# Patient Record
Sex: MALE | Race: WHITE | Employment: OTHER | URBAN - METROPOLITAN AREA
[De-identification: names, ages, dates, MRNs, and addresses within clinical notes are randomized per-mention and may not be internally consistent; named-entity substitution may affect disease eponyms.]

---

## 2020-10-05 RX ORDER — ATORVASTATIN CALCIUM 20 MG/1
20 TABLET, FILM COATED ORAL NIGHTLY
COMMUNITY

## 2020-10-05 RX ORDER — METOPROLOL TARTRATE 50 MG/1
50 TABLET, FILM COATED ORAL 2 TIMES DAILY
COMMUNITY
End: 2021-01-19

## 2020-10-05 RX ORDER — LISINOPRIL 40 MG/1
40 TABLET ORAL DAILY
COMMUNITY

## 2020-10-05 RX ORDER — POTASSIUM CHLORIDE 1.5 G/1.77G
20 POWDER, FOR SOLUTION ORAL DAILY
COMMUNITY

## 2020-10-05 RX ORDER — TIZANIDINE 4 MG/1
4 TABLET ORAL 2 TIMES DAILY
COMMUNITY

## 2020-10-05 RX ORDER — HYDROCHLOROTHIAZIDE 25 MG/1
25 TABLET ORAL DAILY
COMMUNITY

## 2020-10-05 RX ORDER — FUROSEMIDE 20 MG/1
20 TABLET ORAL 2 TIMES DAILY
COMMUNITY

## 2020-10-05 NOTE — PROGRESS NOTES
Preoperative Screening for Elective Surgery/Invasive Procedures While COVID-19 present in the community     Have you had any of the following symptoms? o Fever, chills  o Cough  o Shortness of breath  o Muscle aches/pain  o Diarrhea  o Abdominal pain, nausea, vomiting  o Loss or decrease in taste and / or smell   Risk of Exposure  o Have you recently been hospitalized for COVID-19 or flu-like illness, if so when?  o Recently diagnosed with COVID-19, if so when?  o Recently tested for COVID-19, if so when?  o Have you been in close contact with a person or family member who currently has or recently had COVID-19? If yes, when and in what context?  o Do you live with anybody who in the last 14 days has had fever, chills, shortness of breath, muscle aches, flu-like illness?  o Do you have any close contacts or family members who are currently in the hospital for COVID-19 or flu-like illness? If yes, assess recent close contact with this person. Indicate if the patient has a positive screen by answering yes to one or more of the above questions. Patients who test positive or screen positive prior to surgery or on the day of surgery should be evaluated in conjunction with the surgeon/proceduralist/anesthesiologist to determine the urgency of the procedure.      Pt denies all

## 2020-10-08 ENCOUNTER — HOSPITAL ENCOUNTER (OUTPATIENT)
Age: 60
Setting detail: OUTPATIENT SURGERY
Discharge: HOME HEALTH | End: 2020-10-08
Attending: ORTHOPAEDIC SURGERY | Admitting: ORTHOPAEDIC SURGERY
Payer: MEDICARE

## 2020-10-08 ENCOUNTER — ANESTHESIA (OUTPATIENT)
Dept: OPERATING ROOM | Age: 60
End: 2020-10-08
Payer: MEDICARE

## 2020-10-08 ENCOUNTER — ANESTHESIA EVENT (OUTPATIENT)
Dept: OPERATING ROOM | Age: 60
End: 2020-10-08
Payer: MEDICARE

## 2020-10-08 ENCOUNTER — APPOINTMENT (OUTPATIENT)
Dept: GENERAL RADIOLOGY | Age: 60
End: 2020-10-08
Attending: ORTHOPAEDIC SURGERY
Payer: MEDICARE

## 2020-10-08 VITALS
SYSTOLIC BLOOD PRESSURE: 102 MMHG | DIASTOLIC BLOOD PRESSURE: 53 MMHG | RESPIRATION RATE: 1 BRPM | OXYGEN SATURATION: 100 %

## 2020-10-08 VITALS
DIASTOLIC BLOOD PRESSURE: 57 MMHG | HEIGHT: 72 IN | TEMPERATURE: 97.2 F | OXYGEN SATURATION: 99 % | RESPIRATION RATE: 16 BRPM | BODY MASS INDEX: 36.3 KG/M2 | HEART RATE: 63 BPM | SYSTOLIC BLOOD PRESSURE: 115 MMHG | WEIGHT: 268 LBS

## 2020-10-08 LAB
APPEARANCE FLUID: NORMAL
CELL COUNT FLUID TYPE: NORMAL
CLOT EVALUATION: NORMAL
COLOR FLUID: NORMAL
LYMPHOCYTES, BODY FLUID: 7 %
MONOCYTE, FLUID: 1 %
NEUTROPHIL, FLUID: 92 %
NUCLEATED CELLS FLUID: NORMAL /CUMM
NUMBER OF CELLS COUNTED FLUID: 100
RBC FLUID: NORMAL /CUMM

## 2020-10-08 PROCEDURE — 2580000003 HC RX 258: Performed by: ANESTHESIOLOGY

## 2020-10-08 PROCEDURE — 3600000013 HC SURGERY LEVEL 3 ADDTL 15MIN: Performed by: ORTHOPAEDIC SURGERY

## 2020-10-08 PROCEDURE — 6360000002 HC RX W HCPCS: Performed by: NURSE ANESTHETIST, CERTIFIED REGISTERED

## 2020-10-08 PROCEDURE — 87116 MYCOBACTERIA CULTURE: CPT

## 2020-10-08 PROCEDURE — 2709999900 HC NON-CHARGEABLE SUPPLY: Performed by: ORTHOPAEDIC SURGERY

## 2020-10-08 PROCEDURE — 89051 BODY FLUID CELL COUNT: CPT

## 2020-10-08 PROCEDURE — 20610 DRAIN/INJ JOINT/BURSA W/O US: CPT

## 2020-10-08 PROCEDURE — 87070 CULTURE OTHR SPECIMN AEROBIC: CPT

## 2020-10-08 PROCEDURE — 3209999900 FLUORO FOR SURGICAL PROCEDURES

## 2020-10-08 PROCEDURE — 6360000002 HC RX W HCPCS: Performed by: ORTHOPAEDIC SURGERY

## 2020-10-08 PROCEDURE — 87205 SMEAR GRAM STAIN: CPT

## 2020-10-08 PROCEDURE — 3600000003 HC SURGERY LEVEL 3 BASE: Performed by: ORTHOPAEDIC SURGERY

## 2020-10-08 PROCEDURE — 3700000001 HC ADD 15 MINUTES (ANESTHESIA): Performed by: ORTHOPAEDIC SURGERY

## 2020-10-08 PROCEDURE — 87015 SPECIMEN INFECT AGNT CONCNTJ: CPT

## 2020-10-08 PROCEDURE — 87075 CULTR BACTERIA EXCEPT BLOOD: CPT

## 2020-10-08 PROCEDURE — 87102 FUNGUS ISOLATION CULTURE: CPT

## 2020-10-08 PROCEDURE — 7100000011 HC PHASE II RECOVERY - ADDTL 15 MIN: Performed by: ORTHOPAEDIC SURGERY

## 2020-10-08 PROCEDURE — 7100000010 HC PHASE II RECOVERY - FIRST 15 MIN: Performed by: ORTHOPAEDIC SURGERY

## 2020-10-08 PROCEDURE — 87206 SMEAR FLUORESCENT/ACID STAI: CPT

## 2020-10-08 PROCEDURE — 3700000000 HC ANESTHESIA ATTENDED CARE: Performed by: ORTHOPAEDIC SURGERY

## 2020-10-08 RX ORDER — SODIUM CHLORIDE, SODIUM LACTATE, POTASSIUM CHLORIDE, CALCIUM CHLORIDE 600; 310; 30; 20 MG/100ML; MG/100ML; MG/100ML; MG/100ML
INJECTION, SOLUTION INTRAVENOUS CONTINUOUS
Status: DISCONTINUED | OUTPATIENT
Start: 2020-10-08 | End: 2020-10-08 | Stop reason: HOSPADM

## 2020-10-08 RX ORDER — LIDOCAINE HYDROCHLORIDE 10 MG/ML
2 INJECTION, SOLUTION INFILTRATION; PERINEURAL
Status: DISCONTINUED | OUTPATIENT
Start: 2020-10-08 | End: 2020-10-08 | Stop reason: HOSPADM

## 2020-10-08 RX ORDER — ONDANSETRON 2 MG/ML
4 INJECTION INTRAMUSCULAR; INTRAVENOUS EVERY 10 MIN PRN
Status: DISCONTINUED | OUTPATIENT
Start: 2020-10-08 | End: 2020-10-08 | Stop reason: HOSPADM

## 2020-10-08 RX ORDER — LABETALOL HYDROCHLORIDE 5 MG/ML
5 INJECTION, SOLUTION INTRAVENOUS EVERY 10 MIN PRN
Status: DISCONTINUED | OUTPATIENT
Start: 2020-10-08 | End: 2020-10-08 | Stop reason: HOSPADM

## 2020-10-08 RX ORDER — MEPERIDINE HYDROCHLORIDE 50 MG/ML
12.5 INJECTION INTRAMUSCULAR; INTRAVENOUS; SUBCUTANEOUS EVERY 5 MIN PRN
Status: DISCONTINUED | OUTPATIENT
Start: 2020-10-08 | End: 2020-10-08 | Stop reason: HOSPADM

## 2020-10-08 RX ORDER — OXYCODONE HYDROCHLORIDE AND ACETAMINOPHEN 5; 325 MG/1; MG/1
1 TABLET ORAL PRN
Status: DISCONTINUED | OUTPATIENT
Start: 2020-10-08 | End: 2020-10-08 | Stop reason: HOSPADM

## 2020-10-08 RX ORDER — M-VIT,TX,IRON,MINS/CALC/FOLIC 27MG-0.4MG
1 TABLET ORAL DAILY
COMMUNITY

## 2020-10-08 RX ORDER — ONDANSETRON 2 MG/ML
INJECTION INTRAMUSCULAR; INTRAVENOUS PRN
Status: DISCONTINUED | OUTPATIENT
Start: 2020-10-08 | End: 2020-10-08 | Stop reason: SDUPTHER

## 2020-10-08 RX ORDER — FENTANYL CITRATE 50 UG/ML
INJECTION, SOLUTION INTRAMUSCULAR; INTRAVENOUS PRN
Status: DISCONTINUED | OUTPATIENT
Start: 2020-10-08 | End: 2020-10-08 | Stop reason: SDUPTHER

## 2020-10-08 RX ORDER — MIDAZOLAM HYDROCHLORIDE 1 MG/ML
INJECTION INTRAMUSCULAR; INTRAVENOUS PRN
Status: DISCONTINUED | OUTPATIENT
Start: 2020-10-08 | End: 2020-10-08 | Stop reason: SDUPTHER

## 2020-10-08 RX ORDER — OXYCODONE HYDROCHLORIDE AND ACETAMINOPHEN 5; 325 MG/1; MG/1
2 TABLET ORAL PRN
Status: DISCONTINUED | OUTPATIENT
Start: 2020-10-08 | End: 2020-10-08 | Stop reason: HOSPADM

## 2020-10-08 RX ORDER — HYDRALAZINE HYDROCHLORIDE 20 MG/ML
5 INJECTION INTRAMUSCULAR; INTRAVENOUS EVERY 10 MIN PRN
Status: DISCONTINUED | OUTPATIENT
Start: 2020-10-08 | End: 2020-10-08 | Stop reason: HOSPADM

## 2020-10-08 RX ORDER — PROPOFOL 10 MG/ML
INJECTION, EMULSION INTRAVENOUS PRN
Status: DISCONTINUED | OUTPATIENT
Start: 2020-10-08 | End: 2020-10-08 | Stop reason: SDUPTHER

## 2020-10-08 RX ADMIN — FENTANYL CITRATE 100 MCG: 50 INJECTION INTRAMUSCULAR; INTRAVENOUS at 11:50

## 2020-10-08 RX ADMIN — ONDANSETRON 4 MG: 2 INJECTION INTRAMUSCULAR; INTRAVENOUS at 11:52

## 2020-10-08 RX ADMIN — SODIUM CHLORIDE, POTASSIUM CHLORIDE, SODIUM LACTATE AND CALCIUM CHLORIDE: 600; 310; 30; 20 INJECTION, SOLUTION INTRAVENOUS at 10:34

## 2020-10-08 RX ADMIN — SODIUM CHLORIDE, SODIUM LACTATE, POTASSIUM CHLORIDE, AND CALCIUM CHLORIDE: .6; .31; .03; .02 INJECTION, SOLUTION INTRAVENOUS at 11:27

## 2020-10-08 RX ADMIN — MIDAZOLAM HYDROCHLORIDE 2 MG: 2 INJECTION, SOLUTION INTRAMUSCULAR; INTRAVENOUS at 11:45

## 2020-10-08 RX ADMIN — Medication 3 G: at 11:47

## 2020-10-08 RX ADMIN — PROPOFOL 150 MG: 10 INJECTION, EMULSION INTRAVENOUS at 11:52

## 2020-10-08 ASSESSMENT — PULMONARY FUNCTION TESTS
PIF_VALUE: 0
PIF_VALUE: 13
PIF_VALUE: 0
PIF_VALUE: 1
PIF_VALUE: 0
PIF_VALUE: 11
PIF_VALUE: 0
PIF_VALUE: 0

## 2020-10-08 ASSESSMENT — PAIN DESCRIPTION - DESCRIPTORS: DESCRIPTORS: ACHING

## 2020-10-08 ASSESSMENT — PAIN - FUNCTIONAL ASSESSMENT: PAIN_FUNCTIONAL_ASSESSMENT: 0-10

## 2020-10-08 NOTE — ANESTHESIA PRE PROCEDURE
afternoon and 2 at night      TESTOSTERONE CYPIONATE IJ Inject 0.25 mg as directed once a week Every Friday      potassium chloride (KLOR-CON) 20 MEQ packet Take 20 mEq by mouth daily         Allergies:  No Known Allergies    Problem List:  There is no problem list on file for this patient. Past Medical History:        Diagnosis Date    Hypertension        Past Surgical History:        Procedure Laterality Date    DENTAL SURGERY      teeth extracted    ELBOW SURGERY Right     for tendonitis    FOOT SURGERY Bilateral     bones reset; pins and screws placed right foot    HIP SURGERY Left 1974    left hip pinning/screws    JOINT REPLACEMENT Right 2019    hip replacement x2        Social History:    Social History     Tobacco Use    Smoking status: Former Smoker    Smokeless tobacco: Never Used   Substance Use Topics    Alcohol use: Not Currently                                Counseling given: Not Answered      Vital Signs (Current):   Vitals:    10/05/20 1206   Weight: 265 lb (120.2 kg)   Height: 6' (1.829 m)                                              BP Readings from Last 3 Encounters:   No data found for BP       NPO Status:                                                                                 BMI:   Wt Readings from Last 3 Encounters:   No data found for Wt     Body mass index is 35.94 kg/m². CBC: No results found for: WBC, RBC, HGB, HCT, MCV, RDW, PLT    CMP: No results found for: NA, K, CL, CO2, BUN, CREATININE, GFRAA, AGRATIO, LABGLOM, GLUCOSE, PROT, CALCIUM, BILITOT, ALKPHOS, AST, ALT    POC Tests: No results for input(s): POCGLU, POCNA, POCK, POCCL, POCBUN, POCHEMO, POCHCT in the last 72 hours.     Coags: No results found for: PROTIME, INR, APTT    HCG (If Applicable): No results found for: PREGTESTUR, PREGSERUM, HCG, HCGQUANT     ABGs: No results found for: PHART, PO2ART, VBK8HCK, RMO8KMK, BEART, Z7IXMRGQ     Type & Screen (If Applicable):  No results found for: LABABO, [address]    Drug/Infectious Status (If Applicable):  No results found for: HIV, HEPCAB    COVID-19 Screening (If Applicable): No results found for: COVID19      Anesthesia Evaluation  Patient summary reviewed and Nursing notes reviewed no history of anesthetic complications:   Airway: Mallampati: II  TM distance: >3 FB   Neck ROM: full  Mouth opening: > = 3 FB Dental:    (+) edentulous      Pulmonary:Negative Pulmonary ROS                              Cardiovascular:    (+) hypertension:,                   Neuro/Psych:   Negative Neuro/Psych ROS              GI/Hepatic/Renal: Neg GI/Hepatic/Renal ROS       (-) GERD, liver disease and no renal disease       Endo/Other: Negative Endo/Other ROS       (-) diabetes mellitus               Abdominal:           Vascular: negative vascular ROS. Anesthesia Plan      general     ASA 3     (I discussed with the patient the risks and benefits of PIV, general anesthesia, IV Narcotics, PACU. All questions were answered the patient agrees with the plan)  Induction: intravenous. MIPS: Prophylactic antiemetics administered. Anesthetic plan and risks discussed with patient. Plan discussed with CRNA.                   Chavez Carvajal MD   10/8/2020

## 2020-10-08 NOTE — BRIEF OP NOTE
Brief Postoperative Note      Patient: Lauryn Celis  YOB: 1960  MRN: [id number]    Date of Procedure: 10/8/2020    Pre-Op Diagnosis: RIGHT HIP PAIN/INFECTION    Post-Op Diagnosis: Same       Procedure(s):  RIGHT HIP ASPIRATION WITH SYNOVASURE    Surgeon(s):  Wing Prescott MD    Assistant:  Surgical Assistant: Arthuro Eastern    Anesthesia: General    Estimated Blood Loss (mL): Minimal    Complications: None    Specimens:   ID Type Source Tests Collected by Time Destination   1 : Right hip fluid Body Fluid Fluid BODY FLUID CELL COUNT WITH DIFFERENTIAL Wing Prescott MD 10/8/2020 1200        Implants:  * No implants in log *      Drains: * No LDAs found *    Findings: none    Electronically signed by Wing Prescott MD on 10/8/2020 at 12:01 PM

## 2020-10-08 NOTE — ANESTHESIA POSTPROCEDURE EVALUATION
Department of Anesthesiology  Postprocedure Note    Patient: Miguel Angel Dueñas  MRN: [id number]  YOB: 1960  Date of evaluation: 10/8/2020  Time:  2:31 PM     Procedure Summary     Date:  10/08/20 Room / Location:  [facility]    Anesthesia Start:  1147 Anesthesia Stop:  0436    Procedure:  RIGHT HIP ASPIRATION (Right Hip) Diagnosis:       Infection of prosthetic joint, [facility]      (RIGHT HIP PAIN/INFECTION)    Surgeon:  Stas Hilliard MD Responsible Provider:  Alia Hughes MD    Anesthesia Type:  general ASA Status:  3          Anesthesia Type: general    Darryl Phase I: Darryl Score: 10    Darryl Phase II: Darryl Score: 10    Last vitals: Reviewed and per EMR flowsheets.        Anesthesia Post Evaluation    Patient location during evaluation: PACU  Level of consciousness: awake  Airway patency: patent  Nausea & Vomiting: no nausea  Complications: no  Cardiovascular status: blood pressure returned to baseline  Respiratory status: acceptable  Hydration status: euvolemic

## 2020-10-08 NOTE — H&P
I have reviewed the patients history and physical and have found no changes. I have reviewed the procedure with the patient and answered all questions and explained the risks and benefits. The operative site was marked. Risks benefits of nartotic use and abuse were discussed as were alternatives to their use. They have consented for the use of narcotics. Because of the type of procedure the patient may need and use narcotics above 30 MED. The patient was counseled at length about the risks of arely Covid-19 in the jose-operative and post-operative states including the recovery window of their procedure. The patient was made aware that arely Covid-19 after a surgical procedure may worsen their prognosis for recovering from the virus and lend to a higher morbidity and or mortality risk. The patient was given the options of postponing their procedure. All of the risks, benefits, and alternatives were discussed. The patient does wish to proceed with the procedure.

## 2020-10-26 LAB
CULTURE, JOINT AEROBIC: NORMAL
CULTURE, JOINT ANAEROBIC: NORMAL
GRAM STAIN RESULT: NORMAL

## 2020-11-09 LAB
FUNGUS (MYCOLOGY) CULTURE: NORMAL
FUNGUS STAIN: NORMAL

## 2020-11-24 LAB
AFB CULTURE (MYCOBACTERIA): NORMAL
AFB SMEAR: NORMAL

## 2021-01-15 ENCOUNTER — ANESTHESIA EVENT (OUTPATIENT)
Dept: OPERATING ROOM | Age: 61
DRG: 468 | End: 2021-01-15
Payer: MEDICARE

## 2021-01-19 NOTE — PROGRESS NOTES
Reviewed information regarding patient with [facility] healthcare Nurse, Mariposa Herrera. Labs and history and physical will be faxed tomorrow. Pt can sign own consent. Pt is on Eliquis stopped after this morning dose.

## 2021-01-21 ENCOUNTER — APPOINTMENT (OUTPATIENT)
Dept: GENERAL RADIOLOGY | Age: 61
DRG: 468 | End: 2021-01-21
Attending: ORTHOPAEDIC SURGERY
Payer: MEDICARE

## 2021-01-21 ENCOUNTER — HOSPITAL ENCOUNTER (INPATIENT)
Age: 61
LOS: 2 days | Discharge: NURSING, REHAB OR LONG-TERM CARE | DRG: 468 | End: 2021-01-23
Attending: ORTHOPAEDIC SURGERY | Admitting: ORTHOPAEDIC SURGERY
Payer: MEDICARE

## 2021-01-21 ENCOUNTER — ANESTHESIA (OUTPATIENT)
Dept: OPERATING ROOM | Age: 61
DRG: 468 | End: 2021-01-21
Payer: MEDICARE

## 2021-01-21 VITALS
OXYGEN SATURATION: 100 % | DIASTOLIC BLOOD PRESSURE: 68 MMHG | TEMPERATURE: 98.2 F | RESPIRATION RATE: 9 BRPM | SYSTOLIC BLOOD PRESSURE: 151 MMHG

## 2021-01-21 DIAGNOSIS — Z96.649 S/P REVISION OF TOTAL HIP: Primary | ICD-10-CM

## 2021-01-21 DIAGNOSIS — T84.50XA INFECTION OF PROSTHETIC JOINT, INITIAL ENCOUNTER (HCC): ICD-10-CM

## 2021-01-21 LAB
ABO/RH: NORMAL
ANION GAP SERPL CALCULATED.3IONS-SCNC: 5 MMOL/L (ref 3–16)
ANTIBODY SCREEN: NORMAL
BUN BLDV-MCNC: 12 MG/DL (ref 7–20)
CALCIUM SERPL-MCNC: 9.4 MG/DL (ref 8.3–10.6)
CHLORIDE BLD-SCNC: 102 MMOL/L (ref 99–110)
CO2: 28 MMOL/L (ref 21–32)
CREAT SERPL-MCNC: 0.7 MG/DL (ref 0.8–1.3)
GFR AFRICAN AMERICAN: >60
GFR NON-AFRICAN AMERICAN: >60
GLUCOSE BLD-MCNC: 109 MG/DL (ref 70–99)
HCT VFR BLD CALC: 30.9 % (ref 40.5–52.5)
HEMOGLOBIN: 10.1 G/DL (ref 13.5–17.5)
MCH RBC QN AUTO: 28.1 PG (ref 26–34)
MCHC RBC AUTO-ENTMCNC: 32.8 G/DL (ref 31–36)
MCV RBC AUTO: 85.8 FL (ref 80–100)
PDW BLD-RTO: 14.7 % (ref 12.4–15.4)
PLATELET # BLD: 95 K/UL (ref 135–450)
PLATELET SLIDE REVIEW: ABNORMAL
PMV BLD AUTO: 8.3 FL (ref 5–10.5)
POTASSIUM REFLEX MAGNESIUM: 3.8 MMOL/L (ref 3.5–5.1)
RBC # BLD: 3.6 M/UL (ref 4.2–5.9)
SLIDE REVIEW: ABNORMAL
SODIUM BLD-SCNC: 135 MMOL/L (ref 136–145)
WBC # BLD: 3.1 K/UL (ref 4–11)

## 2021-01-21 PROCEDURE — 87102 FUNGUS ISOLATION CULTURE: CPT

## 2021-01-21 PROCEDURE — 3209999900 FLUORO FOR SURGICAL PROCEDURES

## 2021-01-21 PROCEDURE — C1713 ANCHOR/SCREW BN/BN,TIS/BN: HCPCS | Performed by: ORTHOPAEDIC SURGERY

## 2021-01-21 PROCEDURE — 2580000003 HC RX 258: Performed by: ANESTHESIOLOGY

## 2021-01-21 PROCEDURE — 86900 BLOOD TYPING SEROLOGIC ABO: CPT

## 2021-01-21 PROCEDURE — 87015 SPECIMEN INFECT AGNT CONCNTJ: CPT

## 2021-01-21 PROCEDURE — 87206 SMEAR FLUORESCENT/ACID STAI: CPT

## 2021-01-21 PROCEDURE — 3700000000 HC ANESTHESIA ATTENDED CARE: Performed by: ORTHOPAEDIC SURGERY

## 2021-01-21 PROCEDURE — 88305 TISSUE EXAM BY PATHOLOGIST: CPT

## 2021-01-21 PROCEDURE — 86850 RBC ANTIBODY SCREEN: CPT

## 2021-01-21 PROCEDURE — 88331 PATH CONSLTJ SURG 1 BLK 1SPC: CPT

## 2021-01-21 PROCEDURE — 73501 X-RAY EXAM HIP UNI 1 VIEW: CPT

## 2021-01-21 PROCEDURE — 80048 BASIC METABOLIC PNL TOTAL CA: CPT

## 2021-01-21 PROCEDURE — 0SP908Z REMOVAL OF SPACER FROM RIGHT HIP JOINT, OPEN APPROACH: ICD-10-PCS | Performed by: ORTHOPAEDIC SURGERY

## 2021-01-21 PROCEDURE — 87075 CULTR BACTERIA EXCEPT BLOOD: CPT

## 2021-01-21 PROCEDURE — 7100000000 HC PACU RECOVERY - FIRST 15 MIN: Performed by: ORTHOPAEDIC SURGERY

## 2021-01-21 PROCEDURE — 2500000003 HC RX 250 WO HCPCS: Performed by: ORTHOPAEDIC SURGERY

## 2021-01-21 PROCEDURE — 2500000003 HC RX 250 WO HCPCS: Performed by: NURSE ANESTHETIST, CERTIFIED REGISTERED

## 2021-01-21 PROCEDURE — 97166 OT EVAL MOD COMPLEX 45 MIN: CPT

## 2021-01-21 PROCEDURE — 85027 COMPLETE CBC AUTOMATED: CPT

## 2021-01-21 PROCEDURE — 2709999900 HC NON-CHARGEABLE SUPPLY: Performed by: ORTHOPAEDIC SURGERY

## 2021-01-21 PROCEDURE — 7100000001 HC PACU RECOVERY - ADDTL 15 MIN: Performed by: ORTHOPAEDIC SURGERY

## 2021-01-21 PROCEDURE — C1776 JOINT DEVICE (IMPLANTABLE): HCPCS | Performed by: ORTHOPAEDIC SURGERY

## 2021-01-21 PROCEDURE — 6360000002 HC RX W HCPCS: Performed by: ANESTHESIOLOGY

## 2021-01-21 PROCEDURE — 87116 MYCOBACTERIA CULTURE: CPT

## 2021-01-21 PROCEDURE — 6360000002 HC RX W HCPCS: Performed by: NURSE ANESTHETIST, CERTIFIED REGISTERED

## 2021-01-21 PROCEDURE — 2720000010 HC SURG SUPPLY STERILE: Performed by: ORTHOPAEDIC SURGERY

## 2021-01-21 PROCEDURE — 3600000015 HC SURGERY LEVEL 5 ADDTL 15MIN: Performed by: ORTHOPAEDIC SURGERY

## 2021-01-21 PROCEDURE — 86901 BLOOD TYPING SEROLOGIC RH(D): CPT

## 2021-01-21 PROCEDURE — 2580000003 HC RX 258: Performed by: ORTHOPAEDIC SURGERY

## 2021-01-21 PROCEDURE — 2500000003 HC RX 250 WO HCPCS: Performed by: ANESTHESIOLOGY

## 2021-01-21 PROCEDURE — 3600000005 HC SURGERY LEVEL 5 BASE: Performed by: ORTHOPAEDIC SURGERY

## 2021-01-21 PROCEDURE — 0SW90JZ REVISION OF SYNTHETIC SUBSTITUTE IN RIGHT HIP JOINT, OPEN APPROACH: ICD-10-PCS | Performed by: ORTHOPAEDIC SURGERY

## 2021-01-21 PROCEDURE — 87070 CULTURE OTHR SPECIMN AEROBIC: CPT

## 2021-01-21 PROCEDURE — 3700000001 HC ADD 15 MINUTES (ANESTHESIA): Performed by: ORTHOPAEDIC SURGERY

## 2021-01-21 PROCEDURE — 6370000000 HC RX 637 (ALT 250 FOR IP): Performed by: ORTHOPAEDIC SURGERY

## 2021-01-21 PROCEDURE — 97530 THERAPEUTIC ACTIVITIES: CPT

## 2021-01-21 PROCEDURE — 87205 SMEAR GRAM STAIN: CPT

## 2021-01-21 PROCEDURE — 97162 PT EVAL MOD COMPLEX 30 MIN: CPT

## 2021-01-21 PROCEDURE — 6360000002 HC RX W HCPCS: Performed by: ORTHOPAEDIC SURGERY

## 2021-01-21 PROCEDURE — 2500000003 HC RX 250 WO HCPCS

## 2021-01-21 PROCEDURE — 87176 TISSUE HOMOGENIZATION CULTR: CPT

## 2021-01-21 PROCEDURE — 1200000000 HC SEMI PRIVATE

## 2021-01-21 DEVICE — SHELL ACET MH E 54 MM HIP TI TRITANIUM TRIDENT II: Type: IMPLANTABLE DEVICE | Status: FUNCTIONAL

## 2021-01-21 DEVICE — DEVICE REATTACHMENT L50MM DIA1MM STD TROCHANTERIC TI W/ CO: Type: IMPLANTABLE DEVICE | Status: FUNCTIONAL

## 2021-01-21 DEVICE — LINER ACET SZ E ID36MM THK5.9MM 10DEG X3 FOR 54-56MM: Type: IMPLANTABLE DEVICE | Status: FUNCTIONAL

## 2021-01-21 DEVICE — STEM FEM L155MM DIA18MM DST HIP TI HA STR CONIC CEM MOD REV: Type: IMPLANTABLE DEVICE | Status: FUNCTIONAL

## 2021-01-21 DEVICE — HEAD FEM DIA36MM +5MM OFFSET HIP BIOLOX DELT CERAMIC TAPR: Type: IMPLANTABLE DEVICE | Status: FUNCTIONAL

## 2021-01-21 DEVICE — BODY FEM DIA23MM +0MM OFFSET STD HIP TI CONE MOD REV RESTR: Type: IMPLANTABLE DEVICE | Status: FUNCTIONAL

## 2021-01-21 DEVICE — SCREW BNE L20MM DIA65MM LO PROF HEX TRIDENT LL: Type: IMPLANTABLE DEVICE | Status: FUNCTIONAL

## 2021-01-21 RX ORDER — HYDROMORPHONE HCL 110MG/55ML
0.5 PATIENT CONTROLLED ANALGESIA SYRINGE INTRAVENOUS
Status: DISCONTINUED | OUTPATIENT
Start: 2021-01-21 | End: 2021-01-22

## 2021-01-21 RX ORDER — ACETAMINOPHEN 325 MG/1
650 TABLET ORAL EVERY 6 HOURS PRN
COMMUNITY

## 2021-01-21 RX ORDER — LIDOCAINE HYDROCHLORIDE 20 MG/ML
INJECTION, SOLUTION INFILTRATION; PERINEURAL PRN
Status: DISCONTINUED | OUTPATIENT
Start: 2021-01-21 | End: 2021-01-21 | Stop reason: SDUPTHER

## 2021-01-21 RX ORDER — SENNA PLUS 8.6 MG/1
1 TABLET ORAL NIGHTLY
Status: DISCONTINUED | OUTPATIENT
Start: 2021-01-21 | End: 2021-01-23 | Stop reason: HOSPADM

## 2021-01-21 RX ORDER — OXYCODONE HCL 20 MG/1
20 TABLET, FILM COATED, EXTENDED RELEASE ORAL EVERY 12 HOURS
Status: ON HOLD | COMMUNITY
End: 2021-01-23 | Stop reason: SDUPTHER

## 2021-01-21 RX ORDER — CITALOPRAM 20 MG/1
20 TABLET ORAL DAILY
COMMUNITY

## 2021-01-21 RX ORDER — PANTOPRAZOLE SODIUM 40 MG/1
40 TABLET, DELAYED RELEASE ORAL DAILY
COMMUNITY

## 2021-01-21 RX ORDER — SODIUM CHLORIDE 0.9 % (FLUSH) 0.9 %
10 SYRINGE (ML) INJECTION PRN
Status: DISCONTINUED | OUTPATIENT
Start: 2021-01-21 | End: 2021-01-23 | Stop reason: HOSPADM

## 2021-01-21 RX ORDER — OXYCODONE HYDROCHLORIDE AND ACETAMINOPHEN 5; 325 MG/1; MG/1
1 TABLET ORAL PRN
Status: DISCONTINUED | OUTPATIENT
Start: 2021-01-21 | End: 2021-01-21 | Stop reason: HOSPADM

## 2021-01-21 RX ORDER — HYDROMORPHONE HCL 110MG/55ML
0.25 PATIENT CONTROLLED ANALGESIA SYRINGE INTRAVENOUS
Status: DISCONTINUED | OUTPATIENT
Start: 2021-01-21 | End: 2021-01-22

## 2021-01-21 RX ORDER — SODIUM CHLORIDE 0.9 % (FLUSH) 0.9 %
10 SYRINGE (ML) INJECTION PRN
Status: DISCONTINUED | OUTPATIENT
Start: 2021-01-21 | End: 2021-01-21 | Stop reason: HOSPADM

## 2021-01-21 RX ORDER — CITALOPRAM 20 MG/1
20 TABLET ORAL DAILY
Status: DISCONTINUED | OUTPATIENT
Start: 2021-01-21 | End: 2021-01-22

## 2021-01-21 RX ORDER — ONDANSETRON 2 MG/ML
INJECTION INTRAMUSCULAR; INTRAVENOUS PRN
Status: DISCONTINUED | OUTPATIENT
Start: 2021-01-21 | End: 2021-01-21 | Stop reason: SDUPTHER

## 2021-01-21 RX ORDER — ATORVASTATIN CALCIUM 10 MG/1
20 TABLET, FILM COATED ORAL NIGHTLY
Status: DISCONTINUED | OUTPATIENT
Start: 2021-01-21 | End: 2021-01-23 | Stop reason: HOSPADM

## 2021-01-21 RX ORDER — TRAZODONE HYDROCHLORIDE 50 MG/1
50 TABLET ORAL 2 TIMES DAILY
COMMUNITY

## 2021-01-21 RX ORDER — TRANEXAMIC ACID 100 MG/ML
15 INJECTION, SOLUTION INTRAVENOUS
Status: COMPLETED | OUTPATIENT
Start: 2021-01-21 | End: 2021-01-21

## 2021-01-21 RX ORDER — GLYCOPYRROLATE 1 MG/5 ML
SYRINGE (ML) INTRAVENOUS PRN
Status: DISCONTINUED | OUTPATIENT
Start: 2021-01-21 | End: 2021-01-21 | Stop reason: SDUPTHER

## 2021-01-21 RX ORDER — DEXAMETHASONE SODIUM PHOSPHATE 10 MG/ML
INJECTION INTRAMUSCULAR; INTRAVENOUS PRN
Status: DISCONTINUED | OUTPATIENT
Start: 2021-01-21 | End: 2021-01-21 | Stop reason: SDUPTHER

## 2021-01-21 RX ORDER — MORPHINE SULFATE 2 MG/ML
2 INJECTION, SOLUTION INTRAMUSCULAR; INTRAVENOUS EVERY 5 MIN PRN
Status: DISCONTINUED | OUTPATIENT
Start: 2021-01-21 | End: 2021-01-21 | Stop reason: HOSPADM

## 2021-01-21 RX ORDER — FENTANYL CITRATE 50 UG/ML
INJECTION, SOLUTION INTRAMUSCULAR; INTRAVENOUS PRN
Status: DISCONTINUED | OUTPATIENT
Start: 2021-01-21 | End: 2021-01-21 | Stop reason: SDUPTHER

## 2021-01-21 RX ORDER — RANITIDINE 150 MG/1
150 TABLET ORAL 2 TIMES DAILY PRN
COMMUNITY

## 2021-01-21 RX ORDER — MORPHINE SULFATE 2 MG/ML
1 INJECTION, SOLUTION INTRAMUSCULAR; INTRAVENOUS EVERY 5 MIN PRN
Status: DISCONTINUED | OUTPATIENT
Start: 2021-01-21 | End: 2021-01-21 | Stop reason: HOSPADM

## 2021-01-21 RX ORDER — TRAZODONE HYDROCHLORIDE 50 MG/1
50 TABLET ORAL NIGHTLY
Status: DISCONTINUED | OUTPATIENT
Start: 2021-01-21 | End: 2021-01-23 | Stop reason: HOSPADM

## 2021-01-21 RX ORDER — SODIUM CHLORIDE 0.9 % (FLUSH) 0.9 %
10 SYRINGE (ML) INJECTION EVERY 12 HOURS SCHEDULED
Status: DISCONTINUED | OUTPATIENT
Start: 2021-01-21 | End: 2021-01-23 | Stop reason: HOSPADM

## 2021-01-21 RX ORDER — SODIUM CHLORIDE 0.9 % (FLUSH) 0.9 %
10 SYRINGE (ML) INJECTION EVERY 12 HOURS SCHEDULED
Status: DISCONTINUED | OUTPATIENT
Start: 2021-01-21 | End: 2021-01-21 | Stop reason: HOSPADM

## 2021-01-21 RX ORDER — OXYCODONE HYDROCHLORIDE 15 MG/1
15 TABLET ORAL EVERY 4 HOURS PRN
Status: ON HOLD | COMMUNITY
End: 2021-01-23 | Stop reason: SDUPTHER

## 2021-01-21 RX ORDER — DEXTROSE, SODIUM CHLORIDE, AND POTASSIUM CHLORIDE 5; .45; .15 G/100ML; G/100ML; G/100ML
1000 INJECTION INTRAVENOUS CONTINUOUS
Status: DISCONTINUED | OUTPATIENT
Start: 2021-01-21 | End: 2021-01-23 | Stop reason: HOSPADM

## 2021-01-21 RX ORDER — MAGNESIUM HYDROXIDE 1200 MG/15ML
LIQUID ORAL CONTINUOUS PRN
Status: COMPLETED | OUTPATIENT
Start: 2021-01-21 | End: 2021-01-21

## 2021-01-21 RX ORDER — METOPROLOL TARTRATE 50 MG/1
50 TABLET, FILM COATED ORAL 2 TIMES DAILY
Status: DISCONTINUED | OUTPATIENT
Start: 2021-01-21 | End: 2021-01-23 | Stop reason: HOSPADM

## 2021-01-21 RX ORDER — HYDRALAZINE HYDROCHLORIDE 20 MG/ML
5 INJECTION INTRAMUSCULAR; INTRAVENOUS EVERY 10 MIN PRN
Status: DISCONTINUED | OUTPATIENT
Start: 2021-01-21 | End: 2021-01-21 | Stop reason: HOSPADM

## 2021-01-21 RX ORDER — ROCURONIUM BROMIDE 10 MG/ML
INJECTION, SOLUTION INTRAVENOUS PRN
Status: DISCONTINUED | OUTPATIENT
Start: 2021-01-21 | End: 2021-01-21 | Stop reason: SDUPTHER

## 2021-01-21 RX ORDER — PROMETHAZINE HYDROCHLORIDE 25 MG/ML
6.25 INJECTION, SOLUTION INTRAMUSCULAR; INTRAVENOUS
Status: DISCONTINUED | OUTPATIENT
Start: 2021-01-21 | End: 2021-01-21 | Stop reason: HOSPADM

## 2021-01-21 RX ORDER — SODIUM CHLORIDE, SODIUM LACTATE, POTASSIUM CHLORIDE, CALCIUM CHLORIDE 600; 310; 30; 20 MG/100ML; MG/100ML; MG/100ML; MG/100ML
INJECTION, SOLUTION INTRAVENOUS CONTINUOUS
Status: DISCONTINUED | OUTPATIENT
Start: 2021-01-21 | End: 2021-01-21

## 2021-01-21 RX ORDER — TIZANIDINE 4 MG/1
4 TABLET ORAL 2 TIMES DAILY
Status: DISCONTINUED | OUTPATIENT
Start: 2021-01-21 | End: 2021-01-23 | Stop reason: HOSPADM

## 2021-01-21 RX ORDER — DIPHENHYDRAMINE HYDROCHLORIDE 50 MG/ML
12.5 INJECTION INTRAMUSCULAR; INTRAVENOUS
Status: DISCONTINUED | OUTPATIENT
Start: 2021-01-21 | End: 2021-01-21 | Stop reason: HOSPADM

## 2021-01-21 RX ORDER — PANTOPRAZOLE SODIUM 40 MG/1
40 TABLET, DELAYED RELEASE ORAL DAILY
Status: DISCONTINUED | OUTPATIENT
Start: 2021-01-21 | End: 2021-01-23 | Stop reason: HOSPADM

## 2021-01-21 RX ORDER — LABETALOL HYDROCHLORIDE 5 MG/ML
5 INJECTION, SOLUTION INTRAVENOUS EVERY 10 MIN PRN
Status: DISCONTINUED | OUTPATIENT
Start: 2021-01-21 | End: 2021-01-21 | Stop reason: HOSPADM

## 2021-01-21 RX ORDER — OXYCODONE HYDROCHLORIDE 15 MG/1
15 TABLET ORAL EVERY 4 HOURS PRN
Status: DISCONTINUED | OUTPATIENT
Start: 2021-01-21 | End: 2021-01-23 | Stop reason: HOSPADM

## 2021-01-21 RX ORDER — PROPOFOL 10 MG/ML
INJECTION, EMULSION INTRAVENOUS PRN
Status: DISCONTINUED | OUTPATIENT
Start: 2021-01-21 | End: 2021-01-21 | Stop reason: SDUPTHER

## 2021-01-21 RX ORDER — HYDROMORPHONE HCL 110MG/55ML
PATIENT CONTROLLED ANALGESIA SYRINGE INTRAVENOUS PRN
Status: DISCONTINUED | OUTPATIENT
Start: 2021-01-21 | End: 2021-01-21 | Stop reason: SDUPTHER

## 2021-01-21 RX ORDER — M-VIT,TX,IRON,MINS/CALC/FOLIC 27MG-0.4MG
1 TABLET ORAL DAILY
Status: DISCONTINUED | OUTPATIENT
Start: 2021-01-21 | End: 2021-01-23 | Stop reason: HOSPADM

## 2021-01-21 RX ORDER — ONDANSETRON 2 MG/ML
4 INJECTION INTRAMUSCULAR; INTRAVENOUS PRN
Status: DISCONTINUED | OUTPATIENT
Start: 2021-01-21 | End: 2021-01-21 | Stop reason: HOSPADM

## 2021-01-21 RX ORDER — OXYCODONE HYDROCHLORIDE AND ACETAMINOPHEN 5; 325 MG/1; MG/1
2 TABLET ORAL PRN
Status: DISCONTINUED | OUTPATIENT
Start: 2021-01-21 | End: 2021-01-21 | Stop reason: HOSPADM

## 2021-01-21 RX ORDER — ACETAMINOPHEN 325 MG/1
650 TABLET ORAL EVERY 6 HOURS
Status: DISCONTINUED | OUTPATIENT
Start: 2021-01-21 | End: 2021-01-23 | Stop reason: HOSPADM

## 2021-01-21 RX ORDER — OXYCODONE HCL 20 MG/1
20 TABLET, FILM COATED, EXTENDED RELEASE ORAL EVERY 12 HOURS
Status: DISCONTINUED | OUTPATIENT
Start: 2021-01-21 | End: 2021-01-23 | Stop reason: HOSPADM

## 2021-01-21 RX ORDER — SENNA PLUS 8.6 MG/1
1 TABLET ORAL NIGHTLY
COMMUNITY

## 2021-01-21 RX ORDER — MEPERIDINE HYDROCHLORIDE 50 MG/ML
12.5 INJECTION INTRAMUSCULAR; INTRAVENOUS; SUBCUTANEOUS EVERY 5 MIN PRN
Status: DISCONTINUED | OUTPATIENT
Start: 2021-01-21 | End: 2021-01-21 | Stop reason: HOSPADM

## 2021-01-21 RX ADMIN — CEFAZOLIN SODIUM 2000 MG: 10 INJECTION, POWDER, FOR SOLUTION INTRAVENOUS at 15:36

## 2021-01-21 RX ADMIN — DEXAMETHASONE SODIUM PHOSPHATE 8 MG: 10 INJECTION INTRAMUSCULAR; INTRAVENOUS at 08:26

## 2021-01-21 RX ADMIN — ATORVASTATIN CALCIUM 20 MG: 10 TABLET, FILM COATED ORAL at 20:30

## 2021-01-21 RX ADMIN — FENTANYL CITRATE 150 MCG: 50 INJECTION INTRAMUSCULAR; INTRAVENOUS at 08:21

## 2021-01-21 RX ADMIN — PROPOFOL 50 MG: 10 INJECTION, EMULSION INTRAVENOUS at 10:30

## 2021-01-21 RX ADMIN — MORPHINE SULFATE 1 MG: 2 INJECTION, SOLUTION INTRAMUSCULAR; INTRAVENOUS at 11:04

## 2021-01-21 RX ADMIN — HYDROMORPHONE HYDROCHLORIDE 0.5 MG: 1 INJECTION, SOLUTION INTRAMUSCULAR; INTRAVENOUS; SUBCUTANEOUS at 11:12

## 2021-01-21 RX ADMIN — FENTANYL CITRATE 100 MCG: 50 INJECTION INTRAMUSCULAR; INTRAVENOUS at 09:08

## 2021-01-21 RX ADMIN — LIDOCAINE HYDROCHLORIDE 80 MG: 20 INJECTION, SOLUTION INFILTRATION; PERINEURAL at 08:21

## 2021-01-21 RX ADMIN — SODIUM CHLORIDE, POTASSIUM CHLORIDE, SODIUM LACTATE AND CALCIUM CHLORIDE: 600; 310; 30; 20 INJECTION, SOLUTION INTRAVENOUS at 07:57

## 2021-01-21 RX ADMIN — MORPHINE SULFATE 2 MG: 2 INJECTION, SOLUTION INTRAMUSCULAR; INTRAVENOUS at 11:23

## 2021-01-21 RX ADMIN — Medication 0.2 MG: at 08:55

## 2021-01-21 RX ADMIN — ONDANSETRON 4 MG: 2 INJECTION INTRAMUSCULAR; INTRAVENOUS at 08:26

## 2021-01-21 RX ADMIN — OXYCODONE HYDROCHLORIDE 15 MG: 15 TABLET ORAL at 23:45

## 2021-01-21 RX ADMIN — CEFAZOLIN SODIUM 2 G: 10 INJECTION, POWDER, FOR SOLUTION INTRAVENOUS at 08:18

## 2021-01-21 RX ADMIN — CEFAZOLIN SODIUM 2000 MG: 10 INJECTION, POWDER, FOR SOLUTION INTRAVENOUS at 23:45

## 2021-01-21 RX ADMIN — ROCURONIUM BROMIDE 50 MG: 10 SOLUTION INTRAVENOUS at 08:21

## 2021-01-21 RX ADMIN — OXYCODONE HYDROCHLORIDE 15 MG: 15 TABLET ORAL at 13:45

## 2021-01-21 RX ADMIN — TRANEXAMIC ACID 1700 MG: 100 INJECTION, SOLUTION INTRAVENOUS at 08:29

## 2021-01-21 RX ADMIN — DEXTROSE, SODIUM CHLORIDE, AND POTASSIUM CHLORIDE 1000 ML: 5; .45; .15 INJECTION INTRAVENOUS at 13:49

## 2021-01-21 RX ADMIN — HYDROMORPHONE HYDROCHLORIDE 0.5 MG: 1 INJECTION, SOLUTION INTRAMUSCULAR; INTRAVENOUS; SUBCUTANEOUS at 10:48

## 2021-01-21 RX ADMIN — Medication 10 ML: at 20:31

## 2021-01-21 RX ADMIN — Medication 1 TABLET: at 13:45

## 2021-01-21 RX ADMIN — TIZANIDINE 4 MG: 4 TABLET ORAL at 20:30

## 2021-01-21 RX ADMIN — HYDROMORPHONE HYDROCHLORIDE 0.5 MG: 1 INJECTION, SOLUTION INTRAMUSCULAR; INTRAVENOUS; SUBCUTANEOUS at 10:58

## 2021-01-21 RX ADMIN — ROCURONIUM BROMIDE 10 MG: 10 SOLUTION INTRAVENOUS at 09:19

## 2021-01-21 RX ADMIN — BISACODYL 5 MG: 5 TABLET, COATED ORAL at 13:46

## 2021-01-21 RX ADMIN — PANTOPRAZOLE SODIUM 40 MG: 40 TABLET, DELAYED RELEASE ORAL at 13:46

## 2021-01-21 RX ADMIN — ACETAMINOPHEN 650 MG: 325 TABLET, FILM COATED ORAL at 20:35

## 2021-01-21 RX ADMIN — SODIUM CHLORIDE, POTASSIUM CHLORIDE, SODIUM LACTATE AND CALCIUM CHLORIDE: 600; 310; 30; 20 INJECTION, SOLUTION INTRAVENOUS at 09:59

## 2021-01-21 RX ADMIN — SUGAMMADEX 200 MG: 100 INJECTION, SOLUTION INTRAVENOUS at 10:29

## 2021-01-21 RX ADMIN — HYDROMORPHONE HYDROCHLORIDE 0.25 MG: 2 INJECTION INTRAMUSCULAR; INTRAVENOUS; SUBCUTANEOUS at 15:12

## 2021-01-21 RX ADMIN — HYDROMORPHONE HYDROCHLORIDE 1 MG: 2 INJECTION INTRAMUSCULAR; INTRAVENOUS; SUBCUTANEOUS at 09:56

## 2021-01-21 RX ADMIN — HYDROMORPHONE HYDROCHLORIDE 1 MG: 2 INJECTION INTRAMUSCULAR; INTRAVENOUS; SUBCUTANEOUS at 10:30

## 2021-01-21 RX ADMIN — ACETAMINOPHEN 650 MG: 325 TABLET, FILM COATED ORAL at 13:45

## 2021-01-21 RX ADMIN — TIZANIDINE 4 MG: 4 TABLET ORAL at 13:46

## 2021-01-21 RX ADMIN — HYDROMORPHONE HYDROCHLORIDE 0.5 MG: 2 INJECTION INTRAMUSCULAR; INTRAVENOUS; SUBCUTANEOUS at 20:30

## 2021-01-21 RX ADMIN — CITALOPRAM HYDROBROMIDE 20 MG: 20 TABLET ORAL at 13:45

## 2021-01-21 RX ADMIN — PROPOFOL 200 MG: 10 INJECTION, EMULSION INTRAVENOUS at 08:21

## 2021-01-21 RX ADMIN — MORPHINE SULFATE 2 MG: 2 INJECTION, SOLUTION INTRAMUSCULAR; INTRAVENOUS at 11:09

## 2021-01-21 RX ADMIN — OXYCODONE HYDROCHLORIDE 20 MG: 20 TABLET, FILM COATED, EXTENDED RELEASE ORAL at 18:12

## 2021-01-21 RX ADMIN — SENNOSIDES 8.6 MG: 8.6 TABLET, FILM COATED ORAL at 20:30

## 2021-01-21 RX ADMIN — METOPROLOL TARTRATE 50 MG: 50 TABLET, FILM COATED ORAL at 20:30

## 2021-01-21 RX ADMIN — FAMOTIDINE 20 MG: 10 INJECTION, SOLUTION INTRAVENOUS at 07:56

## 2021-01-21 RX ADMIN — HYDROMORPHONE HYDROCHLORIDE 0.5 MG: 1 INJECTION, SOLUTION INTRAMUSCULAR; INTRAVENOUS; SUBCUTANEOUS at 10:53

## 2021-01-21 RX ADMIN — TRAZODONE HYDROCHLORIDE 50 MG: 50 TABLET ORAL at 23:45

## 2021-01-21 ASSESSMENT — PULMONARY FUNCTION TESTS
PIF_VALUE: 20
PIF_VALUE: 15
PIF_VALUE: 2
PIF_VALUE: 17
PIF_VALUE: 17
PIF_VALUE: 18
PIF_VALUE: 16
PIF_VALUE: 18
PIF_VALUE: 18
PIF_VALUE: 19
PIF_VALUE: 19
PIF_VALUE: 18
PIF_VALUE: 18
PIF_VALUE: 0
PIF_VALUE: 18
PIF_VALUE: 17
PIF_VALUE: 0
PIF_VALUE: 17
PIF_VALUE: 2
PIF_VALUE: 17
PIF_VALUE: 17
PIF_VALUE: 16
PIF_VALUE: 16
PIF_VALUE: 15
PIF_VALUE: 17
PIF_VALUE: 16
PIF_VALUE: 18
PIF_VALUE: 17
PIF_VALUE: 6
PIF_VALUE: 17
PIF_VALUE: 19
PIF_VALUE: 17
PIF_VALUE: 19
PIF_VALUE: 19
PIF_VALUE: 17
PIF_VALUE: 15
PIF_VALUE: 17
PIF_VALUE: 18
PIF_VALUE: 17
PIF_VALUE: 18
PIF_VALUE: 17
PIF_VALUE: 15
PIF_VALUE: 17
PIF_VALUE: 16
PIF_VALUE: 17
PIF_VALUE: 17
PIF_VALUE: 18
PIF_VALUE: 19
PIF_VALUE: 17
PIF_VALUE: 16
PIF_VALUE: 17
PIF_VALUE: 17
PIF_VALUE: 16
PIF_VALUE: 20
PIF_VALUE: 15
PIF_VALUE: 1
PIF_VALUE: 16
PIF_VALUE: 17
PIF_VALUE: 17
PIF_VALUE: 16
PIF_VALUE: 7
PIF_VALUE: 18
PIF_VALUE: 16
PIF_VALUE: 5
PIF_VALUE: 17
PIF_VALUE: 17
PIF_VALUE: 16

## 2021-01-21 ASSESSMENT — PAIN DESCRIPTION - ORIENTATION
ORIENTATION: RIGHT

## 2021-01-21 ASSESSMENT — PAIN DESCRIPTION - DESCRIPTORS
DESCRIPTORS: CONSTANT
DESCRIPTORS: DISCOMFORT;CONSTANT
DESCRIPTORS: CONSTANT
DESCRIPTORS: SORE

## 2021-01-21 ASSESSMENT — PAIN DESCRIPTION - PAIN TYPE
TYPE: SURGICAL PAIN;ACUTE PAIN
TYPE: SURGICAL PAIN
TYPE: SURGICAL PAIN

## 2021-01-21 ASSESSMENT — PAIN SCALES - GENERAL
PAINLEVEL_OUTOF10: 10
PAINLEVEL_OUTOF10: 9
PAINLEVEL_OUTOF10: 10
PAINLEVEL_OUTOF10: 9
PAINLEVEL_OUTOF10: 9
PAINLEVEL_OUTOF10: 10
PAINLEVEL_OUTOF10: 10
PAINLEVEL_OUTOF10: 9
PAINLEVEL_OUTOF10: 9

## 2021-01-21 ASSESSMENT — PAIN DESCRIPTION - LOCATION
LOCATION: HIP
LOCATION: HIP;LEG

## 2021-01-21 NOTE — PROGRESS NOTES
Patient arrived in PACU at this time and placed on monitor in stable condition. Report received from [address] [place] [address] and [address]. Will continue to monitor. Patient has oral airway in place with 8 L/min O2 via nasal cannula.

## 2021-01-21 NOTE — PROGRESS NOTES
Oral airway taken out at this time as patient is awake; patient on room air at this time with O2 in high 90s.  Will continue to monitor

## 2021-01-21 NOTE — ANESTHESIA POSTPROCEDURE EVALUATION
Department of Anesthesiology  Postprocedure Note    Patient: Joan Arevalo  MRN: [id number]  YOB: 1960  Date of evaluation: 1/21/2021  Time:  1:36 PM     Procedure Summary     Date: 01/21/21 Room / Location: [address] [place] [address]    Anesthesia Start: 0818 Anesthesia Stop: 0693    Procedure: REMOVAL OF CEMENT SPACER RIGHT HIP WITH FROZEN SECTION, REVISION TOTAL HIP ARTHROPLASTY                 JOSEY / Doylene Pen (Right ) Diagnosis:       Infection of prosthetic joint, initial encounter ([facility] [address]      (RIGHT HIP INFECTION)    Surgeons: Cortney Montero MD Responsible Provider: Sandra Warner MD    Anesthesia Type: general ASA Status: 3          Anesthesia Type: general    Darrly Phase I: Darryl Score: 4    Darryl Phase II:      Last vitals: Reviewed and per EMR flowsheets.        Anesthesia Post Evaluation    Comments: Postoperative Anesthesia Note    Name:    Joan Arevalo  MRN:      [id number]    Patient Vitals in the past 12 hrs:  01/21/21 1320, BP:(!) 150/79, Temp:98 °F (36.7 °C), Temp src:Oral, Pulse:57, Resp:16, SpO2:96 %  01/21/21 1305, BP:(!) 161/72, Temp:97.8 °F (36.6 °C), Temp src:Oral, Pulse:56, Resp:18, SpO2:100 %  01/21/21 1250, BP:(!) 163/69, Temp:98.3 °F (36.8 °C), Temp src:Oral, Pulse:64, Resp:16, SpO2:100 %  01/21/21 1125, BP:(!) 164/78, Pulse:62, Resp:16, SpO2:95 %  01/21/21 1115, BP:(!) 167/63, Pulse:61, Resp:12, SpO2:99 %  01/21/21 1110, BP:(!) 164/65, Pulse:62, Resp:16, SpO2:97 %  01/21/21 1105, BP:(!) 167/63, Pulse:62, Resp:15, SpO2:97 %  01/21/21 1100, BP:(!) 154/70, Pulse:64, Resp:12, SpO2:95 %  01/21/21 1055, Pulse:64, Resp:17, SpO2:99 %  01/21/21 1050, BP:132/79, Pulse:61, Resp:15, SpO2:97 %  01/21/21 1045, BP:(!) 159/71, Temp:96.8 °F (36 °C), Temp src:Temporal, Pulse:56, Resp:9, SpO2:94 %  01/21/21 0742, BP:(!) 110/52, Temp:97.7 °F (36.5 °C), Temp src:Temporal, Pulse:50, Resp:16, SpO2:97 %, Height:6' 1\" (1.854 m), Weight:251 lb (113.9 kg)     LABS:    CBC Lab Results       Component                Value               Date/Time                  WBC                      3.1 (L)             01/21/2021 07:55 AM        HGB                      10.1 (L)            01/21/2021 07:55 AM        HCT                      30.9 (L)            01/21/2021 07:55 AM        PLT                      95 (L)              01/21/2021 07:55 AM   RENAL  Lab Results       Component                Value               Date/Time                  NA                       135 (L)             01/21/2021 07:55 AM        K                        3.8                 01/21/2021 07:55 AM        CL                       102                 01/21/2021 07:55 AM        CO2                      28                  01/21/2021 07:55 AM        BUN                      12                  01/21/2021 07:55 AM        CREATININE               0.7 (L)             01/21/2021 07:55 AM        GLUCOSE                  109 (H)             01/21/2021 07:55 AM   COAGS  No results found for: PROTIME, INR, APTT    Intake & Output:  @07NEIK@    Nausea & Vomiting:  No    Level of Consciousness:  Awake    Pain Assessment:  Adequate analgesia    Anesthesia Complications:  No apparent anesthetic complications    SUMMARY      Vital signs stable  OK to discharge from Stage I post anesthesia care.   Care transferred from Anesthesiology department on discharge from perioperative area

## 2021-01-21 NOTE — PROGRESS NOTES
Pt transfer from PACU into room 218. Vitals remain stable. [facility] Bed locked and in lowest position. Bed alarm in place. No needs voiced at this time. Will monitor.

## 2021-01-21 NOTE — BRIEF OP NOTE
Brief Postoperative Note      Patient: Yan De León  YOB: 1960  MRN: [id number]    Date of Procedure: 1/21/2021    Pre-Op Diagnosis: RIGHT HIP INFECTION    Post-Op Diagnosis: Same       Procedure(s):  REMOVAL OF CEMENT SPACER RIGHT HIP WITH FROZEN SECTION, REVISION TOTAL HIP ARTHROPLASTY                 Ann Loyola / Zoie Rivera    Surgeon(s):  Bertha Echavarria MD    Assistant:  Surgical Assistant: Sebas Blackwood  Physician Assistant: JAXSON Lion    Anesthesia: General    Estimated Blood Loss (mL): Minimal    Complications: None    Specimens:   ID Type Source Tests Collected by Time Destination   1 : RIGHT HIP TISSUE  Specimen Joint, Hip CULTURE, FUNGUS, CULTURE, SURGICAL, CULTURE WITH SMEAR, ACID FAST Leslye Cohen MD 1/21/2021 0917    A : RIGHT HIP TISSUE FOR FROZEN SECTION Specimen Joint, Hip SURGICAL PATHOLOGY Bertha Echavarria MD 1/21/2021 1524        Implants:  Implant Name Type Inv. Item Serial No.  Lot No. LRB No. Used Action   STEM FEM L155MM [id number] DST HIP TI HA STR CONIC MEHDI MOD REV  STEM FEM L155MM [id number] DST HIP TI HA STR CONIC MEHDI MOD REV  [employer] [facility] [id number] Right 1 Implanted   SHELL ACET MH E 54 MM HIP TI TRITANIUM TRIDENT II  SHELL ACET MH E 54 MM HIP TI [employer] [id number] Right 1 Implanted   SCREW BNE L20MM [id number] LO PROF HEX TRIDENT LL  SCREW BNE L20MM [id number] LO PROF HEX TRIDENT LL  [employer] [id number] Right 1 Implanted   SCREW BNE L20MM [id number] LO PROF HEX TRIDENT LL  SCREW BNE L20MM [id number] LO PROF HEX TRIDENT LL  JOSEY NPR-WD [id number] Right 1 Implanted   LINER ACET SZ E ID36MM THK5. 9MM 10DEG X3 FOR 54-56MM  LINER ACET SZ E ID36MM THK5. 9MM 10DEG X3 FOR 54-56MM  [employer] [facility] [id number] Right 1 Implanted   BODY FEM [id number] +0MM OFFSET STD HIP TI CONE MOD REV RESTR  BODY FEM [id number] +0MM OFFSET STD HIP TI CONE MOD REV RESTR  [employer] [facility] [id number] Right 1 Implanted HEAD FEM [id number] +5MM OFFSET HIP BIOLOX DELT CERAMIC TAPR  HEAD FEM [id number] +5MM OFFSET HIP BIOLOX DELT CERAMIC TAPR  [employer] [facility] [id number] Right 1 Implanted   DEVICE REATTACHMENT L50MM DIA1MM STD TROCHANTERIC TI W/ CO  DEVICE REATTACHMENT L50MM DIA1MM STD TROCHANTERIC TI W/ CO  [employer]- [id number] Right 1 Implanted         Drains: * No LDAs found *    Findings: none    Electronically signed by Topher Newell MD on 1/21/2021 at 10:41 AM

## 2021-01-21 NOTE — PROGRESS NOTES
Occupational Therapy   Occupational Therapy Initial Assessment and Treatment Note  Date: 2021   Patient Name: Keira Rodriguez  MRN: [id number]     : 1960    Date of Service: 2021    Discharge Recommendations:  [address]  OT Equipment Recommendations  Equipment Needed: No  Other: defer to next level of care    Assessment   Performance deficits / Impairments: Decreased functional mobility ; Decreased ADL status; Decreased high-level IADLs;Decreased sensation;Decreased balance  Assessment: Pt reports recently at [facility] for skilled nursing/therapy, but was able to complete BADL without assistance and transfers to w/c without physical assistance. Pt agreeable to participate with encouragement, presenting at up to Mod A for bed mobility, but declined standing activity d/t pain. Continue to assess with OT tx. Prognosis: Good  Decision Making: Medium Complexity  OT Education: OT Role;Plan of Care;Precautions; ADL Adaptive Strategies;Transfer Training;Energy Conservation;Orientation;Equipment  Patient Education: importance of mobility, hip precautions  REQUIRES OT FOLLOW UP: Yes  Activity Tolerance  Activity Tolerance: Patient limited by pain  Safety Devices  Safety Devices in place: Yes  Type of devices: Nurse notified; Left in bed;Call light within reach; Bed alarm in place           Patient Diagnosis(es): The encounter diagnosis was Infection of prosthetic joint, initial encounter ([facility] has a past medical history of Degeneration of lumbar or lumbosacral intervertebral disc, Hepatitis C, chronic (HCC), Hiatal hernia, Hip pain, bilateral, Hyperglycemia, Hypertension, Narcotic abuse ([address] Nontraumatic slipped upper femoral epiphysis, Preventative health care, and Tobacco abuse. Home Equipment: Rolling walker, Reacher  Homemaking Responsibilities: Yes(Not since being at [place] for 5 weeks)  Ambulation Assistance: Independent(with 4WC)  Transfer Assistance: Independent  Active : Yes  Occupation: Full time employment  Type of occupation: manual labor  Additional Comments: Admitted from [address]: Impaired  Vision Exceptions: Wears glasses for reading  Hearing: Exceptions to [facility]  Hearing Exceptions: Hard of hearing/hearing concerns    Orientation  Overall Orientation Status: Within Functional Limits     Balance  Sitting Balance: Supervision(at EOB >8 minutes)  Standing Balance: Unable to assess(comment)(pt declined attempt d/t increased pain)  ADL  Feeding: Setup; Beverage management;Supervision(Pt reports needs dentures but does not have)  LE Dressing: Dependent/Total(for socks)  Additional Comments: Pt declined need for further ADL  Tone RUE  RUE Tone: Normotonic  Tone LUE  LUE Tone: Normotonic  Coordination  Movements Are Fluid And Coordinated: Yes     Bed mobility  Supine to Sit: Moderate assistance  Sit to Supine: Moderate assistance(assist to bring BLE onto bed)  Scooting: Supervision        Cognition  Overall Cognitive Status: Exceptions  Following Commands:  Follows one step commands consistently  Attention Span: Attends with cues to redirect  Problem Solving: Assistance required to generate solutions  Initiation: Requires cues for some        Sensation  Overall Sensation Status: Impaired(Pt reports n/t B feet, states from hx of multiple surgeries)        LUE AROM (degrees)  LUE AROM : WFL  Left Hand AROM (degrees)  Left Hand AROM: WFL  RUE AROM (degrees)  RUE AROM : WFL  Right Hand AROM (degrees)  Right Hand AROM: WFL  LUE Strength  Gross LUE Strength: WFL  RUE Strength  Gross RUE Strength: WFL        Plan   Plan  Times per week: 4-6x/week

## 2021-01-21 NOTE — PROGRESS NOTES
Physical Therapy    Facility/Department: [facility] A2 CARD TELEMETRY  Initial Assessment    NAME: Yan De León  : 1960  MRN: [id number]    Date of Service: 2021    Discharge Recommendations:  Subacute/Skilled Nursing Facility   PT Equipment Recommendations  Equipment Needed: No    Assessment   Body structures, Functions, Activity limitations: Decreased functional mobility ; Increased pain;Decreased balance;Decreased strength;Decreased ROM; Decreased endurance  Assessment: Pt is 57 yo male who presents s/p REMOVAL OF CEMENT SPACER RIGHT HIP WITH FROZEN SECTION, REVISION TOTAL HIP ARTHROPLASTY  posterior lateral approach 2021. Pt mod I with 4WW at baseline. Mod A for supine<>sit this date. Significantly limited d/t pain and unable to tolerate all MONICA exercises. Pt would benefit from continued skilled therapy to address deficits. Recommend SNF at d/c due to lives alone and significant mobility deficits. Treatment Diagnosis: impaired functional mobility  Specific instructions for Next Treatment: progress mobility as tolerated  Prognosis: Good  Decision Making: Medium Complexity  PT Education: Gait Training;Goals; General Safety;PT Role;Disease Specific Education;Plan of Care; Functional Mobility Training;Home Exercise Program;Precautions;Transfer Training;Weight-bearing Education  Patient Education: Pt educated on RLE weight bearing and ROM restrictions -- pt verbalized understanding  Barriers to Learning: none  REQUIRES PT FOLLOW UP: Yes  Activity Tolerance  Activity Tolerance: Patient Tolerated treatment well;Patient limited by pain       Patient Diagnosis(es): The encounter diagnosis was Infection of prosthetic joint, initial encounter ([facility] has a past medical history of Degeneration of lumbar or lumbosacral intervertebral disc, Hepatitis C, chronic (HCC), Hiatal hernia, Hip pain, bilateral, Hyperglycemia, Hypertension, Narcotic abuse ([address] Nontraumatic slipped upper femoral epiphysis, Preventative health care, and Tobacco abuse.   has a past surgical history that includes Hip fracture surgery; joint replacement; Tonsillectomy; Elbow surgery; Foot surgery; hip surgery (Left, 1974); joint replacement (Right, 2019); Elbow surgery (Right); Foot surgery (Bilateral); Dental surgery; and arthrography (Right, 10/8/2020). Restrictions  Restrictions/Precautions  Restrictions/Precautions: General Precautions, Weight Bearing, Fall Risk, ROM Restrictions  Lower Extremity Weight Bearing Restrictions  Partial Weight Bearing Percentage Or Pounds: touch town weightbearing  Position Activity Restriction  Hip Precautions: No hip flexion > 90 degrees, No ADduction, No hip internal rotation  Other position/activity restrictions:  Up with PT day of surgery if on floor by 2pm, Ankle pumps and Quadricep/Gluteal Isometric exercises every 2 hours while awake  Vision/Hearing  Vision: Impaired  Vision Exceptions: Wears glasses for reading  Hearing: Exceptions to [facility]  Hearing Exceptions: Hard of hearing/hearing concerns     Subjective  General  Chart Reviewed: Yes  Patient assessed for rehabilitation services?: Yes  Response To Previous Treatment: Not applicable  Family / Caregiver Present: No  Referring Practitioner: Dr. Leslie Burt MD  Referral Date : 01/21/21  Diagnosis: REMOVAL OF CEMENT SPACER RIGHT HIP WITH FROZEN SECTION, REVISION TOTAL HIP ARTHROPLASTY  posterior lateral approach 1/21/2021  Follows Commands: Within Functional Limits  General Comment  Comments: Pt resting in bed on approach; RN cleared pt for therapy  Subjective  Subjective: pt agreeable to therapy  Pain Screening  Patient Currently in Pain: Yes  Pain Assessment  Pain Assessment: 0-10  Pain Level: 9 Pain Type: Acute pain;Surgical pain  Pain Location: Hip  Pain Orientation: Right  Non-Pharmaceutical Pain Intervention(s): Ambulation/Increased Activity;Cold applied;Repositioned  Response to Pain Intervention: Patient Satisfied    Orientation  Orientation  Overall Orientation Status: Within Functional Limits  Social/Functional History  Social/Functional History  Lives With: Alone  Type of Home: Apartment  Home Layout: One level  Home Access: Level entry  Bathroom Shower/Tub: Walk-in shower(tub with door in to step in)  Bathroom Toilet: Standard  Bathroom Equipment: Hand-held shower  Home Equipment: Rolling walker, Reacher  Homemaking Responsibilities: Yes(Not since being at [place] for 5 weeks)  Ambulation Assistance: Independent(with 3TP)  Transfer Assistance: Independent  Active : Yes  Occupation: Full time employment  Type of occupation: manual labor  Additional Comments: Admitted from [facility]    Objective     AROM RLE (degrees)  RLE General AROM: ankle WFL, knee appears WFL but limited d/t pain; hip limited d/t pain  AROM LLE (degrees)  LLE AROM : WFL  Strength RLE  Comment: ankle at least 3/5, unable to assess knee and hip d/t pain  Strength LLE  Strength LLE: WFL        Bed mobility  Supine to Sit: Moderate assistance  Sit to Supine: Moderate assistance  Comment: HOB elevated, use of rails, cues for technique     Transfers  Sit to Stand: Unable to assess(pt declined d/t pain)  Stand to sit: Unable to assess  Bed to Chair: Unable to assess(declined d/t pain)     Ambulation  Ambulation?: No  WB Status: TTWB RLE     Balance  Sitting - Static: Good  Sitting - Dynamic: Good  Comments: Pt sat EOB > 8 minutes supervision. Exercises  Quad Sets: x 10 BLE  Heelslides: Unable d/t pain  Gluteal Sets: x 10 BLE  Knee Short Arc Quad: unable d/t pain  Ankle Pumps: x 10 BLE  Comments: Cues for technique.  Encouragement to complete d/t pain     Plan   Plan  Times per week: BID 7 days/wk

## 2021-01-21 NOTE — ANESTHESIA PRE PROCEDURE
Department of Anesthesiology  Preprocedure Note       Name:  Ron Thomas   Age:  56 y.o.  :  1960                                          MRN:  [id number]         Date:  2021      Surgeon: Luca Raphael):  Dee Henriquez MD    Procedure: Procedure(s):  REMOVAL OF CEMENT SPACER RIGHT HIP WITH FROZEN SECTION, POSSIBLE REVISION TOTAL HIP ARTHROPLASTY WITH POSSIBLE PLACEMENT OF NEW SPACER                JOSEY / [place]    Medications prior to admission:   Prior to Admission medications    Medication Sig Start Date End Date Taking? Authorizing Provider   acetaminophen (TYLENOL) 325 MG tablet Take 650 mg by mouth every 6 hours as needed for Pain   Yes Historical Provider, MD   miconazole (MICOTIN) 2 % cream Apply topically 2 times daily Apply topically 2 times daily. Yes Historical Provider, MD   citalopram (CELEXA) 20 MG tablet Take 20 mg by mouth daily   Yes Historical Provider, MD   apixaban (ELIQUIS) 2.5 MG TABS tablet Take by mouth 2 times daily For after care for joint prostesis   Yes Historical Provider, MD   oxyCODONE (OXY-IR) 15 MG immediate release tablet Take 15 mg by mouth every 4 hours as needed for Pain (Moderate- hold for sedation or change in mental status). Yes Historical Provider, MD   oxyCODONE (OXYCONTIN) 20 MG extended release tablet Take 20 mg by mouth every 12 hours.    Yes Historical Provider, MD   pantoprazole (PROTONIX) 40 MG tablet Take 40 mg by mouth daily   Yes Historical Provider, MD   raNITIdine (ZANTAC) 150 MG tablet Take 150 mg by mouth 2 times daily as needed for Heartburn   Yes Historical Provider, MD   senna (SENOKOT) 8.6 MG tablet Take 1 tablet by mouth nightly   Yes Historical Provider, MD   traZODone (DESYREL) 50 MG tablet Take 50 mg by mouth nightly   Yes Historical Provider, MD   Multiple Vitamins-Minerals (THERAPEUTIC MULTIVITAMIN-MINERALS) tablet Take 1 tablet by mouth daily   Yes Historical Provider, MD atorvastatin (LIPITOR) 20 MG tablet Take 20 mg by mouth nightly   Yes Historical Provider, MD   tiZANidine (ZANAFLEX) 4 MG tablet Take 4 mg by mouth 2 times daily Takes one with lunch and 2 at bedtime   Yes Historical Provider, MD   LOPRESSOR 50 MG tablet TAKE ONE TABLET BY MOUTH TWICE A DAY 11/9/11  Yes Melisa Rucker,    furosemide (LASIX) 20 MG tablet Take 20 mg by mouth 2 times daily    Historical Provider, MD   hydroCHLOROthiazide (HYDRODIURIL) 25 MG tablet Take 25 mg by mouth daily    Historical Provider, MD   lisinopril (PRINIVIL;ZESTRIL) 40 MG tablet Take 40 mg by mouth daily    Historical Provider, MD   TESTOSTERONE CYPIONATE IJ Inject 0.25 mg as directed once a week Every Friday    Historical Provider, MD   potassium chloride (KLOR-CON) 20 MEQ packet Take 20 mEq by mouth daily    Historical Provider, MD   diclofenac (VOLTAREN) 75 MG EC tablet TAKE 1 TABLET BY MOUTH TWO TIMES A DAY WITH FOOD 3/15/12   Leslie Keepers, DO   aspirin 81 MG EC tablet Take 81 mg by mouth daily.     Historical Provider, MD       Current medications:    Current Facility-Administered Medications   Medication Dose Route Frequency Provider Last Rate Last Admin    ceFAZolin (ANCEF) 2000 mg in dextrose 5 % 100 mL IVPB  2 g Intravenous On Call to [address] MD [place]        tranexamic acid (CYKLOKAPRON) injection 1,769 mg  15 mg/kg Intravenous On Call to [address], MD        lactated ringers infusion   Intravenous Continuous Adela Dick MD        sodium chloride flush 0.9 % injection 10 mL  10 mL Intravenous 2 times per day Adela Dick MD        sodium chloride flush 0.9 % injection 10 mL  10 mL Intravenous PRN Adela Dick MD        famotidine (PEPCID) injection 20 mg  20 mg Intravenous Once Adela Dick MD           Allergies:  No Known Allergies    Problem List:    Patient Active Problem List   Diagnosis Code    Hypertension I10    Tobacco abuse Z72.0  Hepatitis C, chronic (HCC) B18.2    Narcotic abuse ([address] F11.10    Degeneration of lumbar or lumbosacral intervertebral disc M51.37    Hyperglycemia R73.9       Past Medical History:        Diagnosis Date    Degeneration of lumbar or lumbosacral intervertebral disc     Hepatitis C, chronic (HCC)     Hiatal hernia     Hip pain, bilateral     Hyperglycemia     Hypertension     Narcotic abuse ([address]     Oxycontin    Nontraumatic slipped upper femoral epiphysis     Preventative health care     Tobacco abuse        Past Surgical History:        Procedure Laterality Date    ARTHROGRAPHY Right 10/8/2020    RIGHT HIP [facility]      teeth extracted    ELBOW SURGERY      Tendon    ELBOW SURGERY Right     for tendonitis    FOOT SURGERY      Left foot pinning    FOOT SURGERY Bilateral     bones reset; pins and screws placed right foot    HIP FRACTURE SURGERY      Pinning    HIP SURGERY Left 1974    left hip pinning/screws    JOINT REPLACEMENT      Right hip    JOINT REPLACEMENT Right 2019    hip replacement x2     TONSILLECTOMY         Social History:    Social History     Tobacco Use    Smoking status: Former Smoker    Smokeless tobacco: Never Used   Substance Use Topics    Alcohol use: Not Currently     Comment: Occasional                                Counseling given: Not Answered      Vital Signs (Current):   Vitals:    01/14/21 1231 01/21/21 0742   BP:  (!) 110/52   Pulse:  50   Resp:  16   Temp:  97.7 °F (36.5 °C)   TempSrc:  Temporal   SpO2:  97%   Weight: 260 lb (117.9 kg) 251 lb (113.9 kg)   Height: 6' (1.829 m) 6' 1\" (1.854 m)                                              BP Readings from Last 3 Encounters:   01/21/21 (!) 110/52   10/08/20 (!) 115/57   10/08/20 (!) 102/53       NPO Status: Time of last liquid consumption: 2200                        Time of last solid consumption: 2200 Date of last liquid consumption: 01/20/21                        Date of last solid food consumption: 01/20/21    BMI:   Wt Readings from Last 3 Encounters:   01/21/21 251 lb (113.9 kg)   10/08/20 268 lb (121.6 kg)     Body mass index is 33.12 kg/m². CBC:   Lab Results   Component Value Date    WBC 7.2 03/13/2010    RBC 4.05 03/13/2010    HGB 14.3 03/13/2010    HCT 40.0 03/13/2010    MCV 98.6 03/13/2010    RDW 12.3 03/13/2010     03/13/2010       CMP: No results found for: NA, K, CL, CO2, BUN, CREATININE, GFRAA, AGRATIO, LABGLOM, GLUCOSE, PROT, CALCIUM, BILITOT, ALKPHOS, AST, ALT    POC Tests: No results for input(s): POCGLU, POCNA, POCK, POCCL, POCBUN, POCHEMO, POCHCT in the last 72 hours.     Coags: No results found for: PROTIME, INR, APTT    HCG (If Applicable): No results found for: PREGTESTUR, PREGSERUM, HCG, HCGQUANT     ABGs: No results found for: PHART, PO2ART, XGJ2NZM, QPB6LLR, BEART, N7EOJZOG     Type & Screen (If Applicable):  No results found for: LABABO, LABRH    Drug/Infectious Status (If Applicable):  No results found for: HIV, HEPCAB    COVID-19 Screening (If Applicable): No results found for: COVID19      Anesthesia Evaluation  Patient summary reviewed and Nursing notes reviewed no history of anesthetic complications:   Airway: Mallampati: III     Neck ROM: full   Dental:    (+) edentulous      Pulmonary:Negative Pulmonary ROS and normal exam                               Cardiovascular:Negative CV ROS    (+) hypertension:,                   Neuro/Psych:   Negative Neuro/Psych ROS              GI/Hepatic/Renal: Neg GI/Hepatic/Renal ROS  (+) hiatal hernia, hepatitis:, liver disease:,      (-) GERD       Endo/Other: Negative Endo/Other ROS                    Abdominal:           Vascular:                                      Anesthesia Plan      general     ASA 3 (I discussed with the patient the risks and benefits of PIV, general anesthesia, IV Narcotics, PACU. All questions were answered the patient agrees with the plan and wishes to proceed.  )  Induction: intravenous. Pre-Operative Diagnosis: Infection of prosthetic joint, [facility] encounter ([facility] [T84.50XA]    56 y.o.   BMI:  Body mass index is 33.12 kg/m².      Vitals:    01/14/21 1231 01/21/21 0742   BP:  (!) 110/52   Pulse:  50   Resp:  16   Temp:  97.7 °F (36.5 °C)   TempSrc:  Temporal   SpO2:  97%   Weight: 260 lb (117.9 kg) 251 lb (113.9 kg)   Height: 6' (1.829 m) 6' 1\" (1.854 m)       No Known Allergies    Social History     Tobacco Use    Smoking status: Former Smoker    Smokeless tobacco: Never Used   Substance Use Topics    Alcohol use: Not Currently     Comment: Occasional       LABS:    CBC  Lab Results   Component Value Date/Time    WBC 7.2 03/13/2010 06:35 PM    HGB 14.3 03/13/2010 06:35 PM    HCT 40.0 (L) 03/13/2010 06:35 PM     03/13/2010 06:35 PM     RENAL  No results found for: NA, K, CL, CO2, BUN, CREATININE, GLUCOSE  COAGS  No results found for: PROTIME, INR, APTT    Labs reviewed from Cathy Ramirez MD   1/21/2021

## 2021-01-22 PROBLEM — H25.13 CATARACT, NUCLEAR SCLEROTIC SENILE, BILATERAL: Status: ACTIVE | Noted: 2020-10-06

## 2021-01-22 PROBLEM — T88.59XA ANESTHESIA COMPLICATION: Status: ACTIVE | Noted: 2021-01-22

## 2021-01-22 PROBLEM — K74.69 OTHER CIRRHOSIS OF LIVER (HCC): Status: ACTIVE | Noted: 2020-05-06

## 2021-01-22 PROBLEM — N18.30 ANEMIA DUE TO CHRONIC RENAL FAILURE TREATED WITH ERYTHROPOIETIN, STAGE 3 (MODERATE) (HCC): Status: ACTIVE | Noted: 2021-01-22

## 2021-01-22 PROBLEM — R79.89 LOW TESTOSTERONE: Status: ACTIVE | Noted: 2020-05-07

## 2021-01-22 PROBLEM — E23.0 GHD (GROWTH HORMONE DEFICIENCY) (HCC): Status: ACTIVE | Noted: 2020-06-02

## 2021-01-22 PROBLEM — D69.59 DRUG-INDUCED THROMBOCYTOPENIA: Status: ACTIVE | Noted: 2021-01-22

## 2021-01-22 PROBLEM — E23.0 PANHYPOPITUITARISM (HCC): Status: ACTIVE | Noted: 2020-06-02

## 2021-01-22 PROBLEM — R12 HEARTBURN: Status: ACTIVE | Noted: 2019-09-20

## 2021-01-22 PROBLEM — Z96.649 INFECTION OF PROSTHETIC HIP JOINT (HCC): Status: ACTIVE | Noted: 2021-01-22

## 2021-01-22 PROBLEM — K05.30 PERIODONTITIS: Status: ACTIVE | Noted: 2019-03-13

## 2021-01-22 PROBLEM — R19.5 POSITIVE COLORECTAL CANCER SCREENING USING COLOGUARD TEST: Status: ACTIVE | Noted: 2019-09-20

## 2021-01-22 PROBLEM — E66.9 OBESITY, CLASS II, BMI 35-39.9: Status: ACTIVE | Noted: 2019-10-02

## 2021-01-22 PROBLEM — E53.8 FOLATE DEFICIENCY: Status: ACTIVE | Noted: 2019-11-12

## 2021-01-22 PROBLEM — Z87.891 FORMER SMOKER: Status: ACTIVE | Noted: 2020-02-10

## 2021-01-22 PROBLEM — R76.8 POSITIVE ANA (ANTINUCLEAR ANTIBODY): Status: ACTIVE | Noted: 2020-05-07

## 2021-01-22 PROBLEM — T84.498A FAILED ORTHOPEDIC IMPLANT (HCC): Status: ACTIVE | Noted: 2021-01-22

## 2021-01-22 PROBLEM — M35.01 KERATITIS SICCA, BILATERAL (HCC): Status: ACTIVE | Noted: 2020-10-06

## 2021-01-22 PROBLEM — D63.1 ANEMIA DUE TO CHRONIC RENAL FAILURE TREATED WITH ERYTHROPOIETIN, STAGE 3 (MODERATE) (HCC): Status: ACTIVE | Noted: 2021-01-22

## 2021-01-22 PROBLEM — H40.003 GLAUCOMA SUSPECT OF BOTH EYES: Status: ACTIVE | Noted: 2020-10-06

## 2021-01-22 PROBLEM — T84.59XA INFECTION OF PROSTHETIC HIP JOINT (HCC): Status: ACTIVE | Noted: 2021-01-22

## 2021-01-22 PROBLEM — E29.1 SECONDARY MALE HYPOGONADISM: Status: ACTIVE | Noted: 2020-06-02

## 2021-01-22 PROBLEM — R60.0 LOWER EXTREMITY EDEMA: Status: ACTIVE | Noted: 2019-12-12

## 2021-01-22 PROBLEM — G47.09 OTHER INSOMNIA: Status: ACTIVE | Noted: 2020-04-28

## 2021-01-22 PROBLEM — F11.20 METHADONE DEPENDENCE (HCC): Status: ACTIVE | Noted: 2019-09-20

## 2021-01-22 PROBLEM — T50.905A DRUG-INDUCED THROMBOCYTOPENIA: Status: ACTIVE | Noted: 2021-01-22

## 2021-01-22 PROBLEM — Z96.649 S/P REVISION OF TOTAL HIP: Status: ACTIVE | Noted: 2019-10-14

## 2021-01-22 LAB
ANION GAP SERPL CALCULATED.3IONS-SCNC: 7 MMOL/L (ref 3–16)
BASOPHILS ABSOLUTE: 0 K/UL (ref 0–0.2)
BASOPHILS RELATIVE PERCENT: 0.1 %
BUN BLDV-MCNC: 15 MG/DL (ref 7–20)
CALCIUM SERPL-MCNC: 9.5 MG/DL (ref 8.3–10.6)
CHLORIDE BLD-SCNC: 104 MMOL/L (ref 99–110)
CO2: 27 MMOL/L (ref 21–32)
CREAT SERPL-MCNC: 0.7 MG/DL (ref 0.8–1.3)
EOSINOPHILS ABSOLUTE: 0 K/UL (ref 0–0.6)
EOSINOPHILS RELATIVE PERCENT: 0 %
GFR AFRICAN AMERICAN: >60
GFR NON-AFRICAN AMERICAN: >60
GLUCOSE BLD-MCNC: 146 MG/DL (ref 70–99)
HCT VFR BLD CALC: 28.1 % (ref 40.5–52.5)
HEMOGLOBIN: 9.3 G/DL (ref 13.5–17.5)
LYMPHOCYTES ABSOLUTE: 0.8 K/UL (ref 1–5.1)
LYMPHOCYTES RELATIVE PERCENT: 12.1 %
MCH RBC QN AUTO: 28.2 PG (ref 26–34)
MCHC RBC AUTO-ENTMCNC: 33.1 G/DL (ref 31–36)
MCV RBC AUTO: 85.3 FL (ref 80–100)
MONOCYTES ABSOLUTE: 0.5 K/UL (ref 0–1.3)
MONOCYTES RELATIVE PERCENT: 7.6 %
NEUTROPHILS ABSOLUTE: 5.6 K/UL (ref 1.7–7.7)
NEUTROPHILS RELATIVE PERCENT: 80.2 %
PDW BLD-RTO: 14.4 % (ref 12.4–15.4)
PLATELET # BLD: 100 K/UL (ref 135–450)
PMV BLD AUTO: 9 FL (ref 5–10.5)
POTASSIUM REFLEX MAGNESIUM: 4.2 MMOL/L (ref 3.5–5.1)
RBC # BLD: 3.29 M/UL (ref 4.2–5.9)
SARS-COV-2, NAAT: NOT DETECTED
SODIUM BLD-SCNC: 138 MMOL/L (ref 136–145)
WBC # BLD: 6.9 K/UL (ref 4–11)

## 2021-01-22 PROCEDURE — 99221 1ST HOSP IP/OBS SF/LOW 40: CPT | Performed by: INTERNAL MEDICINE

## 2021-01-22 PROCEDURE — 97110 THERAPEUTIC EXERCISES: CPT

## 2021-01-22 PROCEDURE — 2580000003 HC RX 258: Performed by: ORTHOPAEDIC SURGERY

## 2021-01-22 PROCEDURE — 6360000002 HC RX W HCPCS: Performed by: ORTHOPAEDIC SURGERY

## 2021-01-22 PROCEDURE — 1200000000 HC SEMI PRIVATE

## 2021-01-22 PROCEDURE — 80048 BASIC METABOLIC PNL TOTAL CA: CPT

## 2021-01-22 PROCEDURE — 6370000000 HC RX 637 (ALT 250 FOR IP): Performed by: ORTHOPAEDIC SURGERY

## 2021-01-22 PROCEDURE — 6370000000 HC RX 637 (ALT 250 FOR IP): Performed by: INTERNAL MEDICINE

## 2021-01-22 PROCEDURE — 97535 SELF CARE MNGMENT TRAINING: CPT

## 2021-01-22 PROCEDURE — 85025 COMPLETE CBC W/AUTO DIFF WBC: CPT

## 2021-01-22 PROCEDURE — 97530 THERAPEUTIC ACTIVITIES: CPT

## 2021-01-22 PROCEDURE — APPNB30 APP NON BILLABLE TIME 0-30 MINS: Performed by: PHYSICIAN ASSISTANT

## 2021-01-22 PROCEDURE — 36415 COLL VENOUS BLD VENIPUNCTURE: CPT

## 2021-01-22 PROCEDURE — U0002 COVID-19 LAB TEST NON-CDC: HCPCS

## 2021-01-22 RX ORDER — HYDROCHLOROTHIAZIDE 25 MG/1
25 TABLET ORAL DAILY
Status: CANCELLED | OUTPATIENT
Start: 2021-01-22

## 2021-01-22 RX ORDER — LISINOPRIL 20 MG/1
40 TABLET ORAL DAILY
Status: CANCELLED | OUTPATIENT
Start: 2021-01-22

## 2021-01-22 RX ORDER — DOXYCYCLINE HYCLATE 100 MG
100 TABLET ORAL EVERY 12 HOURS SCHEDULED
Status: DISCONTINUED | OUTPATIENT
Start: 2021-01-22 | End: 2021-01-23 | Stop reason: HOSPADM

## 2021-01-22 RX ADMIN — BISACODYL 5 MG: 5 TABLET, COATED ORAL at 08:58

## 2021-01-22 RX ADMIN — HYDROMORPHONE HYDROCHLORIDE 0.5 MG: 2 INJECTION INTRAMUSCULAR; INTRAVENOUS; SUBCUTANEOUS at 11:58

## 2021-01-22 RX ADMIN — HYDROMORPHONE HYDROCHLORIDE 0.5 MG: 2 INJECTION INTRAMUSCULAR; INTRAVENOUS; SUBCUTANEOUS at 15:59

## 2021-01-22 RX ADMIN — DOXYCYCLINE HYCLATE 100 MG: 100 TABLET, COATED ORAL at 11:58

## 2021-01-22 RX ADMIN — OXYCODONE HYDROCHLORIDE 20 MG: 20 TABLET, FILM COATED, EXTENDED RELEASE ORAL at 17:54

## 2021-01-22 RX ADMIN — ACETAMINOPHEN 650 MG: 325 TABLET, FILM COATED ORAL at 01:31

## 2021-01-22 RX ADMIN — OXYCODONE HYDROCHLORIDE 20 MG: 20 TABLET, FILM COATED, EXTENDED RELEASE ORAL at 04:56

## 2021-01-22 RX ADMIN — ATORVASTATIN CALCIUM 20 MG: 10 TABLET, FILM COATED ORAL at 21:59

## 2021-01-22 RX ADMIN — ACETAMINOPHEN 650 MG: 325 TABLET, FILM COATED ORAL at 08:59

## 2021-01-22 RX ADMIN — OXYCODONE HYDROCHLORIDE 15 MG: 15 TABLET ORAL at 21:59

## 2021-01-22 RX ADMIN — OXYCODONE HYDROCHLORIDE 15 MG: 15 TABLET ORAL at 08:58

## 2021-01-22 RX ADMIN — OXYCODONE HYDROCHLORIDE 15 MG: 15 TABLET ORAL at 04:55

## 2021-01-22 RX ADMIN — ACETAMINOPHEN 650 MG: 325 TABLET, FILM COATED ORAL at 21:58

## 2021-01-22 RX ADMIN — ACETAMINOPHEN 650 MG: 325 TABLET, FILM COATED ORAL at 13:17

## 2021-01-22 RX ADMIN — METOPROLOL TARTRATE 50 MG: 50 TABLET, FILM COATED ORAL at 08:58

## 2021-01-22 RX ADMIN — PANTOPRAZOLE SODIUM 40 MG: 40 TABLET, DELAYED RELEASE ORAL at 08:59

## 2021-01-22 RX ADMIN — CITALOPRAM HYDROBROMIDE 20 MG: 20 TABLET ORAL at 08:58

## 2021-01-22 RX ADMIN — APIXABAN 2.5 MG: 2.5 TABLET, FILM COATED ORAL at 21:58

## 2021-01-22 RX ADMIN — Medication 10 ML: at 09:01

## 2021-01-22 RX ADMIN — SENNOSIDES 8.6 MG: 8.6 TABLET, FILM COATED ORAL at 21:58

## 2021-01-22 RX ADMIN — TIZANIDINE 4 MG: 4 TABLET ORAL at 21:59

## 2021-01-22 RX ADMIN — APIXABAN 2.5 MG: 2.5 TABLET, FILM COATED ORAL at 08:58

## 2021-01-22 RX ADMIN — TIZANIDINE 4 MG: 4 TABLET ORAL at 08:59

## 2021-01-22 RX ADMIN — TRAZODONE HYDROCHLORIDE 50 MG: 50 TABLET ORAL at 21:58

## 2021-01-22 RX ADMIN — Medication 10 ML: at 22:02

## 2021-01-22 RX ADMIN — Medication 1 TABLET: at 08:58

## 2021-01-22 RX ADMIN — OXYCODONE HYDROCHLORIDE 15 MG: 15 TABLET ORAL at 13:17

## 2021-01-22 RX ADMIN — METOPROLOL TARTRATE 50 MG: 50 TABLET, FILM COATED ORAL at 21:59

## 2021-01-22 RX ADMIN — OXYCODONE HYDROCHLORIDE 15 MG: 15 TABLET ORAL at 17:58

## 2021-01-22 ASSESSMENT — PAIN DESCRIPTION - LOCATION: LOCATION: HIP;KNEE

## 2021-01-22 ASSESSMENT — PAIN SCALES - GENERAL
PAINLEVEL_OUTOF10: 8
PAINLEVEL_OUTOF10: 9
PAINLEVEL_OUTOF10: 9
PAINLEVEL_OUTOF10: 6
PAINLEVEL_OUTOF10: 8
PAINLEVEL_OUTOF10: 8

## 2021-01-22 ASSESSMENT — PAIN DESCRIPTION - PAIN TYPE
TYPE: SURGICAL PAIN
TYPE: SURGICAL PAIN

## 2021-01-22 ASSESSMENT — PAIN DESCRIPTION - ORIENTATION: ORIENTATION: RIGHT

## 2021-01-22 NOTE — CONSULTS
Infectious Diseases   Consult Note      Reason for Consult:  PJI    Requesting Physician:  JAXSON Hung       Date of Admission: 1/21/2021  Subjective:   CHIEF COMPLAINT:  None given       HPI:    Darryn Dias is a 55yoM with complex orthopedic history, cirrhosis, HTN, HLD     Primary R MONICA 1998   Revision R MONICA 26/4428 complicated by R hip infection, PJI with abscess  S/p LIANET 12/2019, looks like cultures were negative but clinical presentation was considered suggestive of streptococcal infection. He completed an extended course of IV ceftaroline that was complicated by neutropenia. Continued IV abx for about 5 weeks, then followed by po doxycycline. Lost to ID follow-up. Continued pain in the hip. Hip was aspirated 10/2020 showing increased number of PMNs consistent with chronic infection with negative cultures. Had explant 11/6/20, cultures negative, treated empirically with cefepime through ~12/10/20. That was followed by unknown po abx. Orthopedics stopped the po abx on or around 1/12/21. [facility], with periprosthetic femur fracture     Admitted 1/21/21 for revision. Permanent sections pending. Surgical culture pending, GS no organisms seen. Pain escalated this morning after PT but otherwise doing well, afebrile.           Current abx:  Lia-op ancef        Past Surgical History:       Diagnosis Date    Degeneration of lumbar or lumbosacral intervertebral disc     Hepatitis C, chronic (HCC)     Hiatal hernia     Hip pain, bilateral     Hyperglycemia     Hypertension     Narcotic abuse ([address]     Oxycontin    Nontraumatic slipped upper femoral epiphysis     Preventative health care     Tobacco abuse          Procedure Laterality Date    ARTHROGRAPHY Right 10/8/2020    RIGHT HIP ASPIRATION performed by Rosalind Morton MD at [facility]      teeth extracted    ELBOW SURGERY      Tendon    ELBOW SURGERY Right for tendonitis    FOOT SURGERY      Left foot pinning    FOOT SURGERY Bilateral     bones reset; pins and screws placed right foot    HIP FRACTURE SURGERY      Pinning    HIP SURGERY Left 1974    left hip pinning/screws    JOINT REPLACEMENT      Right hip    JOINT REPLACEMENT Right 2019    hip replacement x2     TONSILLECTOMY         Social History:    TOBACCO:   reports that he has quit smoking. He has never used smokeless tobacco.  ETOH:   reports previous alcohol use. There is no history of illicit drug use or other significant epidemiologic exposures.       Family History:       Problem Relation Age of Onset    No Known Problems Mother     No Known Problems Father     Diabetes Other     Heart Disease Other         MI       Current Medications:    Current Facility-Administered Medications: metoprolol tartrate (LOPRESSOR) tablet 50 mg, 50 mg, Oral, BID  atorvastatin (LIPITOR) tablet 20 mg, 20 mg, Oral, Nightly  tiZANidine (ZANAFLEX) tablet 4 mg, 4 mg, Oral, BID  therapeutic multivitamin-minerals 1 tablet, 1 tablet, Oral, Daily  citalopram (CELEXA) tablet 20 mg, 20 mg, Oral, Daily  oxyCODONE (OXY-IR) immediate release tablet 15 mg, 15 mg, Oral, Q4H PRN  oxyCODONE (OXYCONTIN) extended release tablet 20 mg, 20 mg, Oral, Q12H  pantoprazole (PROTONIX) tablet 40 mg, 40 mg, Oral, Daily  senna (SENOKOT) tablet 8.6 mg, 1 tablet, Oral, Nightly  traZODone (DESYREL) tablet 50 mg, 50 mg, Oral, Nightly  dextrose 5 % and 0.45 % NaCl with KCl 20 mEq infusion, 1,000 mL, Intravenous, Continuous  sodium chloride flush 0.9 % injection 10 mL, 10 mL, Intravenous, 2 times per day  sodium chloride flush 0.9 % injection 10 mL, 10 mL, Intravenous, PRN  acetaminophen (TYLENOL) tablet 650 mg, 650 mg, Oral, Q6H  HYDROmorphone (DILAUDID) injection 0.25 mg, 0.25 mg, Intravenous, Q3H PRN **OR** HYDROmorphone (DILAUDID) injection 0.5 mg, 0.5 mg, Intravenous, Q3H PRN magnesium hydroxide (MILK OF MAGNESIA) 400 MG/5ML suspension 30 mL, 30 mL, Oral, Daily PRN  bisacodyl (DULCOLAX) EC tablet 5 mg, 5 mg, Oral, Daily  apixaban (ELIQUIS) tablet 2.5 mg, 2.5 mg, Oral, BID      No Known Allergies       REVIEW OF SYSTEMS:    CONSTITUTIONAL:   No fever, chills, night sweats   EYES:  negative for blurred vision, eye discharge, visual disturbance and icterus  HEENT:  negative for acute hearing loss, tinnitus, ear drainage, sinus pressure, nasal congestion, epistaxis and snoring  RESPIRATORY:  No cough, shortness of breath, hemoptysis  CARDIOVASCULAR:  negative for chest pain, palpitations, exertional chest pressure/discomfort, syncope  GASTROINTESTINAL:  negative for nausea, vomiting, diarrhea, constipation, blood in stool and abdominal pain  GENITOURINARY:  negative for frequency, dysuria, urinary incontinence, decreased urine volume, and hematuria  HEMATOLOGIC/LYMPHATIC:  negative for easy bruising, bleeding and lymphadenopathy  ALLERGIC/IMMUNOLOGIC:  negative for recurrent infections, angioedema, anaphylaxis and drug reactions  ENDOCRINE:  negative for weight changes and diabetic symptoms including polyuria, polydipsia and polyphagia  MUSCULOSKELETAL:   Per HPI  NEUROLOGICAL:  negative for headaches, slurred speech, unilateral weakness  PSYCHIATRIC/BEHAVIORAL: negative for hallucinations, behavioral problems, confusion and agitation.        Objective:   PHYSICAL EXAM:      VITALS:  BP (!) 148/62   Pulse 59   Temp 98.2 °F (36.8 °C) (Oral)   Resp 16   Ht 6' 1\" (1.854 m)   Wt 251 lb (113.9 kg)   SpO2 100%   BMI 33.12 kg/m²      24HR INTAKE/OUTPUT:      Intake/Output Summary (Last 24 hours) at 1/22/2021 1111  Last data filed at 1/22/2021 2492  Gross per 24 hour   Intake 2080 ml   Output 1000 ml   Net 1080 ml     CONSTITUTIONAL:  Awake, alert, cooperative, no apparent distress, and appears stated age HEENT: NCAT, PERRL, EOMI. Sclera white, conjunctiva full. OP with moist mucosal membranes, no thrush, tongue protrudes midline  Edentulous   NECK:  Supple, symmetrical, trachea midline, no adenopathy  LUNGS:  no increased work of breathing  CARDIOVASCULAR:   RRR  ABDOMEN:  normal bowel sounds, soft, flat, NT   PSYCHIATRIC: Oriented to person place and time. No obvious depression or anxiety. MUSCULOSKELETAL:   Bulky dressing RLE  +Pedal edema   SKIN:  normal skin color, texture, turgor and no redness, warmth, or swelling. No palpable nodules or stigmata of embolic phenomenon  NEUROLOGIC: nonfocal exam  ACCESS:   IV site ok       DATA:    Old records have been reviewed    CBC:  Recent Labs     01/21/21  0755 01/22/21  0750   WBC 3.1* 6.9   RBC 3.60* 3.29*   HGB 10.1* 9.3*   HCT 30.9* 28.1*   PLT 95* 100*   MCV 85.8 85.3   MCH 28.1 28.2   MCHC 32.8 33.1   RDW 14.7 14.4      BMP:  Recent Labs     01/21/21  0755 01/22/21  0750   * 138   K 3.8 4.2    104   CO2 28 27   BUN 12 15   CREATININE 0.7* 0.7*   CALCIUM 9.4 9.5   GLUCOSE 109* 146*        Cultures:   1/21 Surgical culture R hip NGTD, GS no organisms       Assessment:     Patient Active Problem List   Diagnosis    Hypertension    Tobacco abuse    Hepatitis C, chronic (HCC)    Narcotic abuse ([facility]    Degeneration of lumbar or lumbosacral intervertebral disc    Hyperglycemia    S/P revision of total hip       Julio Delaney is a 55yoM who is evaluated for the following:    Primary R MONICA 1998  Revision R MONICA 75/4122 complicated by infection, abscess without guiding microbiologic data  S/p I&D 12/2019  Chronic infection s/p explant 11/2020 after which he had about 4 weeks empiric cefepime and a few weeks po abx   All cultures throughout this case have been negative   S/p revision arthroplasty 1/21/21. Surgical culture negative to date, no organisms seen on the GS; forzen sections without acute inflammation, permanent sections pending.       NKDA Recs:  Doing well, without signs of persistent infection now s/p revision   To further decrease risk of another infection, will recommend resuming po suppressive abx, start doxy, plan to continue another month  He should follow-up with his primary ID physician to discuss whether or not that should be extended    All questions addressed        Aleksandra Bro M.D. Thank you for the opportunity to participate in the care of your patient.     Please do not hesitate to contact me:   [phone] office  [phone] mobile

## 2021-01-22 NOTE — CARE COORDINATION
CASE MANAGEMENT INITIAL ASSESSMENT      Reviewed chart and completed assessment via telephone with: Pt via room phone   Explained Case Management role/services. Primary contact information: [facility] Decision Maker :     Joceline Pisano [phone]      Can this person be reached and be able to respond quickly, such as within a few minutes or hours? Yes  Who would be your back-up decision maker? Name Edouard Moralez   Phone Number: [phone]    Admit date/status: 1/21/21  Diagnosis: S/P revision of total hip    Is this a Readmission?:  No      Insurance: [address] required for SNF: Yes       3 night stay required: No    Living arrangements, Adls, care needs, prior to admission: Pt normally lives in 1st floor senior apartment and is independent in all ADLs and drives. However, since his initial hip surgery, he has been receiving skilled therapy at [facility] and [facility] after [place] had covid outbreak. Transportation: Will likely need ambulance transport      [facility] at home:  Walker_x_Cane__xRTS__ BSC__Shower Chair__  02__ HHN__ CPAP__  BiPap__  Hospital Bed__ W/C___ Other__________    Services in the home and/or outpatient, prior to admission: Per SNF    PT/OT recs: SNF     Hospital Exemption Notification (HEN): Not needed - from SNF     Barriers to discharge: None    Plan/comments: Pt wishes to go to [facility] at [place] if they are now accepting pts again. He is agreeable to returning to [facility] if they are not, but states that all of his belongings are at [facility] [place]. MONTY placed call to Marco Alicia in admissions at [facility]; LVM with call back number. Awaiting return call. CM also called [facility] to ensure that pt is able to return should [facility] not be accepting new pts at this time. LVM with call back number.

## 2021-01-22 NOTE — PROGRESS NOTES
Occupational Therapy  Facility/Department: [facility] A2 CARD TELEMETRY  Daily Treatment Note  NAME: Lucho Chand  : 1960  MRN: [id number]    Date of Service: 2021    Discharge Recommendations:  Subacute/Skilled Nursing Facility     Assessment   Performance deficits / Impairments: Decreased functional mobility ; Decreased ADL status; Decreased high-level IADLs;Decreased sensation;Decreased balance  Assessment: Pt demos good progress this date, tolerated 3 min static stand with CGA-min A and RW. Pt requires min cueing to maintain TTWB. Pt functioning below his baseline with the above noted occupational performance deficits and would benefit from continued skilled OT to address in SNF setting. Prognosis: Good  OT Education: OT Role;Plan of Care;Precautions; ADL Adaptive Strategies;Transfer Training;Energy Conservation;Equipment  Patient Education: disease specific: precautions, role of OT, transfers  REQUIRES OT FOLLOW UP: Yes  Activity Tolerance  Activity Tolerance: Patient Tolerated treatment well  Safety Devices  Safety Devices in place: Yes  Type of devices: Nurse notified; Left in bed;Call light within reach; Bed alarm in place;Gait belt         Patient Diagnosis(es): The encounter diagnosis was Infection of prosthetic joint, initial encounter ([address] has a past medical history of Degeneration of lumbar or lumbosacral intervertebral disc, Hepatitis C, chronic (HCC), Hiatal hernia, Hip pain, bilateral, Hyperglycemia, Hyperlipemia, Hypertension, Narcotic abuse ([address] Nontraumatic slipped upper femoral epiphysis, Preventative health care, and Tobacco abuse.   has a past surgical history that includes Hip fracture surgery; joint replacement; Tonsillectomy; Elbow surgery; Foot surgery; hip surgery (Left, ); joint replacement (Right, ); Elbow surgery (Right); Foot surgery (Bilateral); Dental surgery; arthrography (Right, 10/8/2020); and Revision total hip arthroplasty (Right, 2021). Restrictions  Restrictions/Precautions  Restrictions/Precautions: General Precautions, Weight Bearing, Fall Risk, ROM Restrictions  Lower Extremity Weight Bearing Restrictions  Right Lower Extremity Weight Bearing: Toe Touch Weight Bearing  Partial Weight Bearing Percentage Or Pounds: touch town weightbearing  Position Activity Restriction  Hip Precautions: No hip flexion > 90 degrees, No ADduction, No hip internal rotation  Other position/activity restrictions: Up with PT day of surgery if on floor by 2pm, Ankle pumps and Quadricep/Gluteal Isometric exercises every 2 hours while awake     Subjective   General  Chart Reviewed: Yes  Patient assessed for rehabilitation services?: Yes  Response to previous treatment: Patient with no complaints from previous session  Family / Caregiver Present: No  Referring Practitioner: Lisandro Rios MD  Diagnosis: R hip infection, s/p REMOVAL OF CEMENT SPACER RIGHT HIP WITH FROZEN SECTION, REVISION TOTAL HIP ARTHROPLASTY 1/21/21    Subjective  Subjective: Pt resting in bed, agreeable to OT treatment.     Pain Assessment  Pain Assessment: 0-10  Pain Level: 8  Pain Type: Surgical pain  Pain Location: Hip  Pain Orientation: Right  Non-Pharmaceutical Pain Intervention(s): Ambulation/Increased Activity;Repositioned  Pre Treatment Pain Screening  Intervention List: Patient able to continue with treatment    Vital Signs  Pulse: 74  BP: (!) 137/56  Patient Currently in Pain: Yes  Oxygen Therapy  SpO2: 98 %  O2 Device: None (Room air)     Orientation  Orientation  Overall Orientation Status: Within Functional Limits     Objective    ADL  LE Dressing: Maximum assistance(to doff briefs)  Additional Comments: Pt declined need for further ADL     Balance  Sitting Balance: Supervision  Standing Balance: Dependent/Total(min A for static stand 3 mins with RW, min A of 2 for stand step transfers)  Standing Balance  Activity: stand step transfer to/from [facility] with RW    Toilet Transfers

## 2021-01-22 NOTE — PROGRESS NOTES
Department of Orthopedic Surgery  Physician Assistant   Progress Note    Subjective:       Systemic or Specific Complaints:  Hip soreness noted today, awaiting therapy. No new issues per patient. Objective:     Patient Vitals for the past 24 hrs:   BP Temp Temp src Pulse Resp SpO2   01/22/21 0831 (!) 148/62 98.2 °F (36.8 °C) Oral 59 16 100 %   01/22/21 0445 138/62 97.7 °F (36.5 °C) Oral 53 15 94 %   01/21/21 2330 (!) 145/67 97.6 °F (36.4 °C) Oral 60 14 100 %   01/21/21 2000 126/62 98.2 °F (36.8 °C)  57  99 %   01/21/21 1617 (!) 107/58 98.8 °F (37.1 °C) Oral 70  93 %   01/21/21 1335 (!) 163/63 98.1 °F (36.7 °C) Oral 59 16 99 %   01/21/21 1320 (!) 150/79 98 °F (36.7 °C) Oral 57 16 96 %   01/21/21 1305 (!) 161/72 97.8 °F (36.6 °C) Oral 56 18 100 %   01/21/21 1250 (!) 163/69 98.3 °F (36.8 °C) Oral 64 16 100 %       General: alert, appears stated age, cooperative and no distress   Wound: Wound clean and dry no evidence of infection. Motion: Painful range of Motion in affected extremity   DVT Exam: No evidence of DVT seen on physical exam.     Additional exam:  NVI to right LE  Able to move toes and ankle without difficulty  Thigh soft but swollen    Data Review  CBC:   Lab Results   Component Value Date    WBC 6.9 01/22/2021    RBC 3.29 01/22/2021    HGB 9.3 01/22/2021    HCT 28.1 01/22/2021     01/22/2021       Renal:   Lab Results   Component Value Date     01/22/2021    K 4.2 01/22/2021     01/22/2021    CO2 27 01/22/2021    BUN 15 01/22/2021    CREATININE 0.7 01/22/2021    GLUCOSE 146 01/22/2021    CALCIUM 9.5 01/22/2021            Assessment:      POD # 1 right hip revision, removal of cement spacer. Plan:      1:  Continue current plan of care, IM to follow. Labs reviewed and stable post op.   ID consulted for 2 stage reimplantation, pt follows with Dr. Stacie Bernal.  2:  Continue Deep venous thrombosis prophylaxis- Eliquis  3:  Continue Pain Control PRN 4:  Physical Therapy and Occupational Therapy, TTWB on right LE  5:  D/c planning for return to SNF. Pt has most recently been at [facility] but wishes to return to [facility] in [place], where his belongings are.   DCP updated and will follow    Nelia Haley PA-C

## 2021-01-22 NOTE — CONSULTS
[facility]    1/22/2021 11:57 AM    Patient Information: Pascual Felix   Date of Admit:  1/21/2021  Primary Care Physician:  Gold Kelly  Requesting Physician:  Gabi Amaya MD    Reason for consult:   Medical evaluation and recommendations for CAD, carotid stenosis     Chief complaint:  R hip pain     History of Present Illness:  Pascual Felix is a 56 y.o. male on Gabi Amaya MD service who was admitted on 1/21/2021 for R hip revision total hip arthroplasty with removal of cement spacer. He reports this is 4th surgery. He has been previously on long term antibiotics and follows with ID outpatient. He has been at rehab past few months discharged yesterday and came for surgery yesterday. He reports no fevers, chills, sweats. He does endorse R hip pain. No other pertinent ROS. He admits to history of chronic pain syndrome, was previously on methadone prior to rehab. Now on oxycontin 20mg BID with oxycodone 15mg IR PRN. History obtained from patient. Old records reviewed; including care everywhere. REVIEW OF SYSTEMS:   Constitutional:  Negative for fever, chills or night sweats  Eyes:  Negative for exudate, itching  Ears:  Negative for tinnitus   Nose:  Negative for rhinorrhea, epistaxis  Mouth/Throat:  Negative for hoarseness, sore throat. Respiratory:   Negative for shortness of breath, wheezing  Cardiovascular: Negative for chest pain, palpitations   Gastrointestinal:  Negative for nausea, vomiting, diarrhea  Genitourinary:  Negative for polyuria, dysuria   Hematologic/Lymphatic:  Negative for  bleeding tendency, easy bruising  Musculoskeletal:  Negative for myalgias, arthralgias  Neurologic:  Negative for  confusion,dysarthria. Skin :  Negative for itching, rash  Psychiatric:  Negative for depression, anxiety. Endocrine:  Negative for polydipsia, polyuria, heat /cold intolerance. Past Medical History:   has a past medical history of Degeneration of lumbar or lumbosacral intervertebral disc, Hepatitis C, chronic ([facility] [address] Hiatal hernia, Hip pain, bilateral, Hyperglycemia, Hyperlipemia, Hypertension, Narcotic abuse ([facility] [address] Nontraumatic slipped upper femoral epiphysis, Preventative health care, and Tobacco abuse. Past Surgical History:   has a past surgical history that includes Hip fracture surgery; joint replacement; Tonsillectomy; Elbow surgery; Foot surgery; hip surgery (Left, 1974); joint replacement (Right, 2019); Elbow surgery (Right); Foot surgery (Bilateral); Dental surgery; and arthrography (Right, 10/8/2020). Medications:   doxycycline hyclate  100 mg Oral 2 times per day    metoprolol tartrate  50 mg Oral BID    atorvastatin  20 mg Oral Nightly    tiZANidine  4 mg Oral BID    therapeutic multivitamin-minerals  1 tablet Oral Daily    citalopram  20 mg Oral Daily    oxyCODONE  20 mg Oral Q12H    pantoprazole  40 mg Oral Daily    senna  1 tablet Oral Nightly    traZODone  50 mg Oral Nightly    sodium chloride flush  10 mL Intravenous 2 times per day    acetaminophen  650 mg Oral Q6H    bisacodyl  5 mg Oral Daily    apixaban  2.5 mg Oral BID       Allergies:  No Known Allergies     Social History:   reports that he has quit smoking. He has never used smokeless tobacco. He reports previous alcohol use. He reports that he does not use drugs. Family History:  family history includes Diabetes in an other family member; Heart Disease in an other family member; No Known Problems in his father and mother. Physical Exam:  BP (!) 148/62   Pulse 59   Temp 98.2 °F (36.8 °C) (Oral)   Resp 16   Ht 6' 1\" (1.854 m)   Wt 251 lb (113.9 kg)   SpO2 100%   BMI 33.12 kg/m²     General appearance: Appears comfortable.  Well nourished  Eyes: Sclera clear, pupils equal  ENT: Moist mucus membranes, no thrush  Neck: Trachea midline, symmetrical Cardiovascular: Regular rhythm, normal S1, S2. No murmur, gallop, rub. No edema in  lower extremities  Respiratory: Clear to auscultation bilaterally. No wheeze. Good inspiratory effort  Gastrointestinal: Abdomen soft, not tender, not distended, normal bowel sounds  Musculoskeletal: No cyanosis in digits, warm extremities  Neurologic: Cranial nerves grossly intact, no motor or speech deficits. Psychiatric: Normal affect. Alert and oriented to time, place and person. Skin: Warm, dry, normal turgor, no rash. R HIP with bruising around dressing but is C/D/I. Labs:  CBC:   Lab Results   Component Value Date    WBC 6.9 01/22/2021    RBC 3.29 01/22/2021    HGB 9.3 01/22/2021    HCT 28.1 01/22/2021    MCV 85.3 01/22/2021    MCH 28.2 01/22/2021    MCHC 33.1 01/22/2021    RDW 14.4 01/22/2021     01/22/2021    MPV 9.0 01/22/2021     BMP:    Lab Results   Component Value Date     01/22/2021    K 4.2 01/22/2021     01/22/2021    CO2 27 01/22/2021    BUN 15 01/22/2021    CREATININE 0.7 01/22/2021    CALCIUM 9.5 01/22/2021    GFRAA >60 01/22/2021    LABGLOM >60 01/22/2021    GLUCOSE 146 01/22/2021           Problem List:    Principal Problem:    S/P revision of total hip  Active Problems:    Essential hypertension    Anemia due to chronic renal failure treated with erythropoietin, stage 3 (moderate)  Resolved Problems:    * No resolved hospital problems. *     Assessment & Plan:     Complex R hip history of recurrent infections s/p multiple surgeries   Most recently removal of spacer and revision of total hip arthoplasty 01/21/1  Ortho following  ID following, recommending doxy x 1 month with follow up with primary ID outpatient  PT   Pain management. Wean off dilaudid. Tolerating diet. Discussed plan with patient.    Bowel regimen    Hypogonadism  Hold testosterone     HTN  Resume lisinopril and hctz  Continue metoprolol    HLD  Resume home statin       Hx of hepatitis C infection s/p treatment

## 2021-01-22 NOTE — PROGRESS NOTES
Physical Therapy  Facility/Department: [facility] A2 CARD TELEMETRY  Daily Treatment Note  NAME: Ashvin Monsivais  : 1960  MRN: [id number]    Date of Service: 2021    Discharge Recommendations:  Subacute/Skilled Nursing Facility   PT Equipment Recommendations  Equipment Needed: No    Assessment   Body structures, Functions, Activity limitations: Decreased functional mobility ; Increased pain;Decreased balance;Decreased strength;Decreased ROM; Decreased endurance  Assessment: Pt mod I with bed mobility. Mod Ax2 for transfers this date. Significantly less limited by pain today and able to tolerate all MONICA exercises. Pt would benefit from continued skilled therapy to address deficits. Recommend SNF at d/c due to lives alone and significant mobility deficits. Treatment Diagnosis: impaired functional mobility  Prognosis: Good  Decision Making: Medium Complexity  PT Education: Gait Training;Goals; General Safety;PT Role;Disease Specific Education;Plan of Care; Functional Mobility Training;Home Exercise Program;Precautions;Transfer Training;Weight-bearing Education  Patient Education: Pt educated on RLE weight bearing and ROM restrictions -- pt verbalized understanding  Barriers to Learning: none  REQUIRES PT FOLLOW UP: Yes  Activity Tolerance  Activity Tolerance: Patient Tolerated treatment well  Activity Tolerance: O2 98%. HR 74  /56     Patient Diagnosis(es): The encounter diagnosis was Infection of prosthetic joint, initial encounter ([address] has a past medical history of Degeneration of lumbar or lumbosacral intervertebral disc, Hepatitis C, chronic (HCC), Hiatal hernia, Hip pain, bilateral, Hyperglycemia, Hyperlipemia, Hypertension, Narcotic abuse ([address] Nontraumatic slipped upper femoral epiphysis, Preventative health care, and Tobacco abuse. has a past surgical history that includes Hip fracture surgery; joint replacement; Tonsillectomy; Elbow surgery; Foot surgery; hip surgery (Left, 1974); joint replacement (Right, 2019); Elbow surgery (Right); Foot surgery (Bilateral); Dental surgery; arthrography (Right, 10/8/2020); and Revision total hip arthroplasty (Right, 1/21/2021). Restrictions  Restrictions/Precautions  Restrictions/Precautions: General Precautions, Weight Bearing, Fall Risk, ROM Restrictions  Lower Extremity Weight Bearing Restrictions  Partial Weight Bearing Percentage Or Pounds: touch town weightbearing  Position Activity Restriction  Hip Precautions: No hip flexion > 90 degrees, No ADduction, No hip internal rotation  Other position/activity restrictions: Up with PT day of surgery if on floor by 2pm, Ankle pumps and Quadricep/Gluteal Isometric exercises every 2 hours while awake  Subjective   General  Chart Reviewed: Yes  Response To Previous Treatment: Patient with no complaints from previous session. Family / Caregiver Present: No  Referring Practitioner: Dr. Karri Qureshi MD  Subjective  Subjective: pt agreeable to therapy  General Comment  Comments: Pt resting in bed on approach; RN cleared pt for therapy  Pain Screening  Patient Currently in Pain: Yes  Pain Assessment  Pain Assessment: 0-10  Pain Level: 9  Non-Pharmaceutical Pain Intervention(s): Ambulation/Increased Activity;Repositioned  Vital Signs  Patient Currently in Pain: Yes       Orientation  Orientation  Overall Orientation Status: Within Functional Limits  Cognition      Objective   Bed mobility  Supine to Sit: Moderate assistance  Transfers  Sit to Stand: 2 Person Assistance; Moderate Assistance  Stand to sit: 2 Person Assistance; Moderate Assistance  Bed to Chair: 2 Person Assistance; Moderate assistance(sw)  Ambulation  Ambulation?: No  WB Status: TTWB RLE     Balance  Sitting - Static: Good  Sitting - Dynamic: Good;-  Standing - Static: Fair  Standing - Dynamic: Fair;- Exercises  Quad Sets: x 10 BLE  Heelslides: 10 B  Gluteal Sets: x 10 BLE  Knee Long Arc Quad: 10 B  Ankle Pumps: x 10 BLE         Comment: Pt stood for 3 mins in sw min a. Pt transferred bed<>BS commode mod 2 using sw. Pt able to maintain TTWB RLE throughout but required initial cues and assist to maintain during STS transfers. SECOND SESSION:    Subjective:  Pain 8/10    THEREX:   Ankle pumps 10  Gluteal sets 10  Quadricep Isometrics 10  Heel slides 10  SAQ 10  LAQ 10    BED MOBILITY:  Sup<>sit requires up to min A    TRANSFERS:  Sit<>stand with Mod A      Education:   Educated patient in orthopedic precautions with patient verbalizing understanding    Disposition:Patient with call light in reach and alarm in place at end of session with patient in bed       AM-PAC Score     AM-PAC Inpatient Mobility without Stair Climbing Raw Score : 9 (01/22/21 1308)  AM-PAC Inpatient without Stair Climbing T-Scale Score : 32.44 (01/22/21 1308)  Mobility Inpatient CMS 0-100% Score: 76.07 (01/22/21 1308)  Mobility Inpatient without Stair CMS G-Code Modifier : CL (01/22/21 1308)       Goals  Short term goals  Time Frame for Short term goals: 1 week (1/28) unless otherwise specified  Short term goal 1: Pt will be SBA for bed mobility. 1/22 mod A  Short term goal 2: Pt will mod A x 2 for sit<>Stand and bed<>chair transfers with LRAD. 1/22 Met.   New goal: w/ min A  Short term goal 3: Pt will ambulate 20 ft with mod A x 2 and LRAD.   1/22 N/T  Short term goal 4: Pt will participate in 12-15 reps of MONICA protocol HEP by 1/25.   1/22 on-going  Patient Goals   Patient goals : \"to be able to get up to a chair without physical assist by the time I leave\"    Plan    Plan  Times per week: BID 7 days/wk  Times per day: Twice a day  Specific instructions for Next Treatment: progress mobility as tolerated Current Treatment Recommendations: Strengthening, Neuromuscular Re-education, Home Exercise Program, ROM, Safety Education & Training, Balance Training, Endurance Training, Functional Mobility Training, Transfer Training, Gait Training, Equipment Evaluation, Education, & procurement, Patient/Caregiver Education & Training  Safety Devices  Type of devices:  All fall risk precautions in place, Bed alarm in place, Call light within reach, Gait belt, Patient at risk for falls, Left in bed, Nurse notified     Therapy Time   Individual Concurrent Group Co-treatment   Time In 1043         Time Out 1121         Minutes 38         Timed Code Treatment Minutes: [address] Time:   Individual Concurrent Group Co-treatment   Time In 1503        Time Out 1532         Minutes 29           Timed Code Treatment Minutes:  [address] [facility]

## 2021-01-23 VITALS
OXYGEN SATURATION: 100 % | WEIGHT: 251 LBS | SYSTOLIC BLOOD PRESSURE: 150 MMHG | DIASTOLIC BLOOD PRESSURE: 60 MMHG | BODY MASS INDEX: 33.27 KG/M2 | HEIGHT: 73 IN | HEART RATE: 60 BPM | TEMPERATURE: 98.2 F | RESPIRATION RATE: 20 BRPM

## 2021-01-23 LAB
BASOPHILS ABSOLUTE: 0 K/UL (ref 0–0.2)
BASOPHILS RELATIVE PERCENT: 0.1 %
EOSINOPHILS ABSOLUTE: 0 K/UL (ref 0–0.6)
EOSINOPHILS RELATIVE PERCENT: 0.9 %
HCT VFR BLD CALC: 27.1 % (ref 40.5–52.5)
HEMOGLOBIN: 9 G/DL (ref 13.5–17.5)
LYMPHOCYTES ABSOLUTE: 1.3 K/UL (ref 1–5.1)
LYMPHOCYTES RELATIVE PERCENT: 22 %
MCH RBC QN AUTO: 28.4 PG (ref 26–34)
MCHC RBC AUTO-ENTMCNC: 33 G/DL (ref 31–36)
MCV RBC AUTO: 86.2 FL (ref 80–100)
MONOCYTES ABSOLUTE: 0.5 K/UL (ref 0–1.3)
MONOCYTES RELATIVE PERCENT: 9 %
NEUTROPHILS ABSOLUTE: 3.9 K/UL (ref 1.7–7.7)
NEUTROPHILS RELATIVE PERCENT: 68 %
PDW BLD-RTO: 15 % (ref 12.4–15.4)
PLATELET # BLD: 111 K/UL (ref 135–450)
PMV BLD AUTO: 8.7 FL (ref 5–10.5)
RBC # BLD: 3.15 M/UL (ref 4.2–5.9)
WBC # BLD: 5.8 K/UL (ref 4–11)

## 2021-01-23 PROCEDURE — APPNB30 APP NON BILLABLE TIME 0-30 MINS: Performed by: PHYSICIAN ASSISTANT

## 2021-01-23 PROCEDURE — 97116 GAIT TRAINING THERAPY: CPT

## 2021-01-23 PROCEDURE — 36415 COLL VENOUS BLD VENIPUNCTURE: CPT

## 2021-01-23 PROCEDURE — 6370000000 HC RX 637 (ALT 250 FOR IP): Performed by: INTERNAL MEDICINE

## 2021-01-23 PROCEDURE — 6370000000 HC RX 637 (ALT 250 FOR IP): Performed by: ORTHOPAEDIC SURGERY

## 2021-01-23 PROCEDURE — 97530 THERAPEUTIC ACTIVITIES: CPT

## 2021-01-23 PROCEDURE — 97110 THERAPEUTIC EXERCISES: CPT

## 2021-01-23 PROCEDURE — 85025 COMPLETE CBC W/AUTO DIFF WBC: CPT

## 2021-01-23 PROCEDURE — 2580000003 HC RX 258: Performed by: ORTHOPAEDIC SURGERY

## 2021-01-23 PROCEDURE — 97535 SELF CARE MNGMENT TRAINING: CPT

## 2021-01-23 RX ORDER — OXYCODONE HYDROCHLORIDE 15 MG/1
15 TABLET ORAL EVERY 4 HOURS PRN
Qty: 10 TABLET | Refills: 0 | Status: SHIPPED | OUTPATIENT
Start: 2021-01-23 | End: 2021-01-26

## 2021-01-23 RX ORDER — OXYCODONE HCL 20 MG/1
20 TABLET, FILM COATED, EXTENDED RELEASE ORAL EVERY 12 HOURS
Qty: 6 TABLET | Refills: 0 | Status: SHIPPED | OUTPATIENT
Start: 2021-01-23 | End: 2021-01-26

## 2021-01-23 RX ORDER — DOXYCYCLINE HYCLATE 100 MG
100 TABLET ORAL EVERY 12 HOURS SCHEDULED
Qty: 60 TABLET | Refills: 0
Start: 2021-01-23 | End: 2021-02-22

## 2021-01-23 RX ADMIN — OXYCODONE HYDROCHLORIDE 20 MG: 20 TABLET, FILM COATED, EXTENDED RELEASE ORAL at 17:16

## 2021-01-23 RX ADMIN — OXYCODONE HYDROCHLORIDE 15 MG: 15 TABLET ORAL at 12:46

## 2021-01-23 RX ADMIN — DOXYCYCLINE HYCLATE 100 MG: 100 TABLET, COATED ORAL at 08:36

## 2021-01-23 RX ADMIN — BISACODYL 5 MG: 5 TABLET, COATED ORAL at 08:36

## 2021-01-23 RX ADMIN — TIZANIDINE 4 MG: 4 TABLET ORAL at 08:36

## 2021-01-23 RX ADMIN — OXYCODONE HYDROCHLORIDE 15 MG: 15 TABLET ORAL at 02:22

## 2021-01-23 RX ADMIN — OXYCODONE HYDROCHLORIDE 20 MG: 20 TABLET, FILM COATED, EXTENDED RELEASE ORAL at 05:03

## 2021-01-23 RX ADMIN — ACETAMINOPHEN 650 MG: 325 TABLET, FILM COATED ORAL at 08:36

## 2021-01-23 RX ADMIN — APIXABAN 2.5 MG: 2.5 TABLET, FILM COATED ORAL at 08:36

## 2021-01-23 RX ADMIN — ACETAMINOPHEN 650 MG: 325 TABLET, FILM COATED ORAL at 12:46

## 2021-01-23 RX ADMIN — ACETAMINOPHEN 650 MG: 325 TABLET, FILM COATED ORAL at 02:22

## 2021-01-23 RX ADMIN — OXYCODONE HYDROCHLORIDE 15 MG: 15 TABLET ORAL at 08:36

## 2021-01-23 RX ADMIN — PANTOPRAZOLE SODIUM 40 MG: 40 TABLET, DELAYED RELEASE ORAL at 08:36

## 2021-01-23 RX ADMIN — Medication 1 TABLET: at 08:36

## 2021-01-23 RX ADMIN — METOPROLOL TARTRATE 50 MG: 50 TABLET, FILM COATED ORAL at 08:36

## 2021-01-23 RX ADMIN — Medication 10 ML: at 08:37

## 2021-01-23 ASSESSMENT — PAIN DESCRIPTION - DESCRIPTORS
DESCRIPTORS: ACHING

## 2021-01-23 ASSESSMENT — PAIN DESCRIPTION - ORIENTATION
ORIENTATION: RIGHT
ORIENTATION: RIGHT

## 2021-01-23 ASSESSMENT — PAIN SCALES - GENERAL
PAINLEVEL_OUTOF10: 8
PAINLEVEL_OUTOF10: 8
PAINLEVEL_OUTOF10: 7

## 2021-01-23 ASSESSMENT — PAIN DESCRIPTION - PAIN TYPE
TYPE: SURGICAL PAIN
TYPE: SURGICAL PAIN

## 2021-01-23 ASSESSMENT — PAIN DESCRIPTION - DIRECTION: RADIATING_TOWARDS: KNEE

## 2021-01-23 ASSESSMENT — PAIN DESCRIPTION - LOCATION
LOCATION: HIP
LOCATION: HIP

## 2021-01-23 ASSESSMENT — PAIN DESCRIPTION - PROGRESSION: CLINICAL_PROGRESSION: NOT CHANGED

## 2021-01-23 ASSESSMENT — PAIN DESCRIPTION - FREQUENCY: FREQUENCY: CONTINUOUS

## 2021-01-23 ASSESSMENT — PAIN - FUNCTIONAL ASSESSMENT
PAIN_FUNCTIONAL_ASSESSMENT: PREVENTS OR INTERFERES SOME ACTIVE ACTIVITIES AND ADLS
PAIN_FUNCTIONAL_ASSESSMENT: PREVENTS OR INTERFERES SOME ACTIVE ACTIVITIES AND ADLS

## 2021-01-23 ASSESSMENT — PAIN DESCRIPTION - ONSET: ONSET: ON-GOING

## 2021-01-23 NOTE — PROGRESS NOTES
Occupational Therapy  Facility/Department: [facility] A2 CARD TELEMETRY  Daily Treatment Note  NAME: Jose Roberto Bishop  : 1960  MRN: [id number]    Date of Service: 2021    Discharge Recommendations:  Subacute/Skilled Nursing Facility       Assessment   Performance deficits / Impairments: Decreased functional mobility ; Decreased ADL status; Decreased high-level IADLs;Decreased sensation;Decreased balance    Assessment: Patient resting in bed upon arrival and agreeable to therapy session. Patient requires encouragement to complete OOB mobility and cues for his TTWB. Min A for supine to sit with HOB elevated and Min A for sit to stand up to RW. Patient required max cues for TTWB as at times putting more than allowed throught foot. Min A for chair transfer this date. Patient moving better this date compared to previous session. Continue OT per POC. Prognosis: Good  OT Education: OT Role;Plan of Care;Precautions; ADL Adaptive Strategies;Transfer Training;Energy Conservation;Equipment  REQUIRES OT FOLLOW UP: Yes  Activity Tolerance  Activity Tolerance: Patient Tolerated treatment well  Safety Devices  Safety Devices in place: Yes  Type of devices: Left in chair;Call light within reach; Chair alarm in place;Gait belt;Nurse notified         Patient Diagnosis(es): The primary encounter diagnosis was S/P revision of total hip. A diagnosis of Infection of prosthetic joint, initial encounter [facility]) was also pertinent to this visit. has a past medical history of Degeneration of lumbar or lumbosacral intervertebral disc, Hepatitis C, chronic (HCC), Hiatal hernia, Hip pain, bilateral, Hyperglycemia, Hyperlipemia, Hypertension, Narcotic abuse ([facility] [address] Nontraumatic slipped upper femoral epiphysis, Preventative health care, and Tobacco abuse. Functional - Mobility Device: Rolling Walker  Activity: (bed to chair)  Assist Level: Minimal assistance  Toilet Transfers  Toilet Transfers Comments: patient declining needing to use the toilet at this time     Transfers  Sit to stand: Minimal assistance  Stand to sit: Minimal assistance        Plan   Plan  Times per week: 4-6x/week  Current Treatment Recommendations: Strengthening, Balance Training, Functional Mobility Training, Endurance Training, Gait Training, Wheelchair Mobility Training, Pain Management, Positioning, Safety Education & Training, Patient/Caregiver Education & Training, Self-Care / ADL, Equipment Evaluation, Education, & procurement    AM-PAC Score        AM-PAC Inpatient Daily Activity Raw Score: 15 (01/23/21 1000)  AM-PAC Inpatient ADL T-Scale Score : 34.69 (01/23/21 1000)  ADL Inpatient CMS 0-100% Score: 56.46 (01/23/21 1000)  ADL Inpatient CMS G-Code Modifier : CK (01/23/21 1000)    Goals  Short term goals  Time Frame for Short term goals: 1 week (by 1/28/21)  Short term goal 1: Pt will complete LE dressing with Mod A, use of AE as needed-- ongoing 1/22/21  Short term goal 2: Pt will tolerate 2 minutes standing activity with Min A in preparation for standing ADL-- ongoing 1/22/21  Short term goal 3: Pt will verbalize/demonstrate understanding of a safe car transfer by 1/24-- ongoing 1/22/21  Patient Goals   Patient goals : \"get up and down from the toilet by myself (without physical assistance, SBA or better)\"-- ongoing 1/22/21       Therapy Time   Individual Concurrent Group Co-treatment   Time In       0859   Time Out       0922   Minutes       23   Timed Code Treatment Minutes: 23 Minutes       Amina Saini, OT

## 2021-01-23 NOTE — OP NOTE
[address] [address]                                OPERATIVE REPORT    PATIENT NAME: Susan Johnson                    :        1960  MED REC NO:   [id number]                          ROOM:       8274  ACCOUNT NO:   [de-identified]                           ADMIT DATE: 2021  PROVIDER:     Mckenna Horton MD    DATE OF PROCEDURE:  2021    PREOPERATIVE DIAGNOSIS:  Right hip infection. PROCEDURES PERFORMED:  1. Removal of right hip cement spacer. 2.  Revision of right total hip arthroplasty. 3.  Frozen section/biopsy. IMPLANT:  Cele. SURGEON:  Mckenna Horton MD    ASSISTANT:  JAXSON Bundy    COMPLICATIONS:  None. DRAINS AND TUBES:  None. ESTIMATED BLOOD LOSS:  200 mL. JAXSON Bundy is the first assistant during this procedure. He  assisted with the patient positioning, retractor placement, exposure and  closure. INDICATIONS FOR THE SURGERY:  This is a pleasant 49-year-old male with  right hip infection, who presented today for the above procedure. The  risks, benefits and alternatives were discussed, including neurologic  injury, vascular injury, infection, DVT, leg length inequality,  continued pain, persistent deformity and infection. Other potential  complications were discussed and alternatives were discussed and he  consented to the procedure. OPERATIVE PROCEDURE:  The patient was taken to the operating room,  placed supine on the operating room table under successful general  anesthesia and placed lateral.  The right hip and leg was prepped and  draped in the usual sterile fashion. His old posterior approach  incision was opened up down to the skin, tissue and fascia down to the  level of the implant. The hip then was dislocated. The femoral spacer  was then removed.   Frozen section was then performed, which showed no signs of acute inflammation. Cultures were obtained. The acetabulum  was identified. The acetabulum was repaired with hemispheric reamers by  medialized reaming. The appropriate sized Bellwood cup was impacted into  appropriate abduction, anteversion and secured with cancellous bone  screw and a trial liner. The femur was then prepared using the Cele  reamers. The reamer was inserted. X-rays were obtained, which showed  good sizing. Real implant was then placed. The hip was reduced and  ranged. Leg lengths appeared equal.  Range of motion was excellent. There were no signs of instability. The _liner was then placed. The hip  was again ranged. Leg lengths, range of motion everything again was  checked and everything looked good. The wound was copiously irrigated  and closed in layers. Sterile dressing was applied. The patient was  then taken from the operating room to Recovery, where he arrived in a  stable condition.         Vicki Coleman MD    D: 01/23/2021 12:28:08       T: 01/23/2021 14:44:13     [email or web address]  Job#: [id number]     Doc#: [id number]    CC:

## 2021-01-23 NOTE — PROGRESS NOTES
Department of Orthopedic Surgery  Physician Assistant   Progress Note    Subjective:       Systemic or Specific Complaints: pain noted overnight, no new issues. Lying comfortably in bed. Objective:     Patient Vitals for the past 24 hrs:   BP Temp Temp src Pulse Resp SpO2   01/23/21 0815 (!) 118/56 97.8 °F (36.6 °C) Oral 51 20 99 %   01/23/21 0445 134/67 97.6 °F (36.4 °C) Oral 52 18 97 %   01/22/21 2349 134/61 98.3 °F (36.8 °C) Oral 76 16 97 %   01/22/21 2145 (!) 148/65 99 °F (37.2 °C) Oral 84 15 96 %   01/22/21 1608 (!) 151/67 98.3 °F (36.8 °C) Oral 81 14 99 %   01/22/21 1340 (!) 137/56   74  98 %       General: alert, appears stated age, cooperative and no distress   Wound: Wound clean and dry no evidence of infection. Motion: Painful range of Motion in affected extremity   DVT Exam: No evidence of DVT seen on physical exam.     Additional exam:  NVI to right LE  Able to move toes and ankle without difficulty  Thigh soft but swollen    Data Review  CBC:   Lab Results   Component Value Date    WBC 6.9 01/22/2021    RBC 3.29 01/22/2021    HGB 9.3 01/22/2021    HCT 28.1 01/22/2021     01/22/2021       Renal:   Lab Results   Component Value Date     01/22/2021    K 4.2 01/22/2021     01/22/2021    CO2 27 01/22/2021    BUN 15 01/22/2021    CREATININE 0.7 01/22/2021    GLUCOSE 146 01/22/2021    CALCIUM 9.5 01/22/2021            Assessment:      POD #2 right hip revision, removal of cement spacer. Plan:      1:  Continue current plan of care, IM following. Labs reviewed and stable post op. ID consulted for 2 stage reimplantation, pt follows with Dr. Ashly Griffith.  Plan for PO doxy for 1 month. Cultures negative.    2:  Continue Deep venous thrombosis prophylaxis- Eliquis  3:  Continue Pain Control PRN  4:  Physical Therapy and Occupational Therapy, TTWB on right LE 5:  D/c planning for return to SNF. Pt has most recently been at [facility] but wishes to return to [facility] in [place], where his belongings are. Awaiting return call from facilities. Pt stable for d/c to SNF when arranged. Rx on chart.   DCP updated    Mary Elizalde PA-C

## 2021-01-23 NOTE — CARE COORDINATION
CASE MANAGEMENT DISCHARGE SUMMARY      Discharge to: SNF @ [facility] completed: [facility] Exemption Notification (HENS) completed: N/A    New Durable Medical Equipment ordered/agency: per facility    Transportation:    Family/car: no   Medical Transport explained to pt/family. Pt/family voice no agency preference. Agency used: [facility] [address] up time: [address]    Ambulance form completed: Yes    Confirmed discharge plan with:     Patient: yes per RN     Family, name and contact number: left vm Claudell Dolphin [phone]   Facility/Agency, name: [facility] faxed   Phone number for report to facility: [phone]     RN, name: Jeremias Stoner RN    Note: Discharging nurse to complete APOLLO, reconcile AVS, and place final copy with patient's discharge packet. RN to ensure that written prescriptions for  Level II medications are sent with patient to the facility as per protocol.

## 2021-01-23 NOTE — PROGRESS NOTES
Assessment completed, see doc flow sheet, in bed resting, call light within reach, without distress, vital signs stable, denies needs at present time, will continue to monitor and treat. Marylou Wolfe

## 2021-01-23 NOTE — PROGRESS NOTES
Physical Therapy  Facility/Department: [facility] A2 CARD TELEMETRY  Daily Treatment Note  NAME: Lucho Chand  : 1960  MRN: [id number]    Date of Service: 2021    Discharge Recommendations:  Subacute/Skilled Nursing Facility   PT Equipment Recommendations  Equipment Needed: No    Assessment   Body structures, Functions, Activity limitations: Decreased functional mobility ; Increased pain;Decreased balance;Decreased strength;Decreased ROM; Decreased endurance  Assessment: PT tx session focused on functional transfers, initiation of gait training, BLE strengthening and ROM. Pt with good tolerance to seated/supine exercises. Continues to require min A for transfers, able to progress to ambulating 10ft with RW and min A this date. Improved ability to maintain true TTWB with demonstration, will require continued training and reinforcement. Pt remains unsafe to return home alone secondary to significant mobility deficits and no assistance available. WIll benefit from SNF at d/c to maximize functional independence and safety. Treatment Diagnosis: impaired functional mobility  Specific instructions for Next Treatment: progress mobility as tolerated  Prognosis: Good  Decision Making: Medium Complexity  PT Education: Gait Training;Goals; General Safety;PT Role;Disease Specific Education;Plan of Care; Functional Mobility Training;Home Exercise Program;Precautions;Transfer Training;Weight-bearing Education  Patient Education: Pt is able to name 3/3 hip precautions and WB status, requires cues to maintain TTWB during functional mobility, will require reinforcement. Barriers to Learning: none  REQUIRES PT FOLLOW UP: Yes  Activity Tolerance  Activity Tolerance: Patient Tolerated treatment well  Activity Tolerance: No c/o chest pain, SOB or dizziness. Limited by pain with mobility. AM-PAC Inpatient Mobility without Stair Climbing Raw Score : 14 (01/23/21 1425)  AM-PAC Inpatient without Stair Climbing T-Scale Score : 40.85 (01/23/21 1425)  Mobility Inpatient CMS 0-100% Score: 53.33 (01/23/21 1425)  Mobility Inpatient without Stair CMS G-Code Modifier : CK (01/23/21 1425)       Goals  Short term goals  Time Frame for Short term goals: 1 week (1/28) unless otherwise specified  Short term goal 1: Pt will be SBA for bed mobility. 1/23: min A  Short term goal 2: Pt will mod A x 2 for sit<>Stand and bed<>chair transfers with LRAD. 1/22 Met. New goal: w/ min A - MET 1/23. New goal: w/ CGA. Short term goal 3: Pt will ambulate 20 ft with mod A x 2 and LRAD.   1/23: 10ft with min A x 1 and RW. Short term goal 4: Pt will participate in 12-15 reps of MONICA protocol HEP by 1/25.   1/22 on-going  Patient Goals   Patient goals : \"to be able to get up to a chair without physical assist by the time I leave\"    Plan    Plan  Times per week: BID 7 days/wk  Times per day: Twice a day  Specific instructions for Next Treatment: progress mobility as tolerated  Current Treatment Recommendations: Strengthening, Neuromuscular Re-education, Home Exercise Program, ROM, Safety Education & Training, Balance Training, Endurance Training, Functional Mobility Training, Transfer Training, Gait Training, Equipment Evaluation, Education, & procurement, Patient/Caregiver Education & Training  Safety Devices  Type of devices:  All fall risk precautions in place, Call light within reach, Chair alarm in place, Gait belt, Nurse notified, Left in chair, Patient at risk for falls  Restraints  Initially in place: No     Therapy Time   Individual Concurrent Group Co-treatment   Time In 1342         Time Out 1416         Minutes 34         Timed Code Treatment Minutes: [address], PT If pt is unable to be seen after this session, please let this note serve as discharge summary. Please see case management note for discharge disposition. Thank you.

## 2021-01-23 NOTE — PROGRESS NOTES
Physical Therapy  Facility/Department: [facility] A2 CARD TELEMETRY  Daily Treatment Note  NAME: Joan Arevalo  : 1960  MRN: [id number]    Date of Service: 2021    Discharge Recommendations:  Subacute/Skilled Nursing Facility   PT Equipment Recommendations  Equipment Needed: No    Assessment   Body structures, Functions, Activity limitations: Decreased functional mobility ; Increased pain;Decreased balance;Decreased strength;Decreased ROM; Decreased endurance  Assessment: PT tx session focused on functional transfers and BLE strengthening/ROM. Pt able to tolerate RLE HEP supine and seated, required min A for sit<>stand and stand pivot transfers with RW. Pt with difficulty maintaining true TTWB despite cues, will benefit from further training. Pt unsafe to return home alone secondary to significant mobility deficits and decreased ability to safely maintain TTWB. Will benefit from SNF at D/c to maximize functional independence and safety. Treatment Diagnosis: impaired functional mobility  Specific instructions for Next Treatment: progress mobility as tolerated  Prognosis: Good  Decision Making: Medium Complexity  PT Education: Gait Training;Goals; General Safety;PT Role;Disease Specific Education;Plan of Care; Functional Mobility Training;Home Exercise Program;Precautions;Transfer Training;Weight-bearing Education  Patient Education: Pt is able to name 3/3 hip precautions and WB status, however requires cues to maintain TTWB during functional mobility, will require reinforcement. Barriers to Learning: none  REQUIRES PT FOLLOW UP: Yes  Activity Tolerance  Activity Tolerance: Patient Tolerated treatment well  Activity Tolerance: No c/o chest pain, SOB or dizziness. Patient Diagnosis(es): The primary encounter diagnosis was S/P revision of total hip. A diagnosis of Infection of prosthetic joint, initial encounter [facility]) was also pertinent to this visit. has a past medical history of Degeneration of lumbar or lumbosacral intervertebral disc, Hepatitis C, chronic (HCC), Hiatal hernia, Hip pain, bilateral, Hyperglycemia, Hyperlipemia, Hypertension, Narcotic abuse ([address] Nontraumatic slipped upper femoral epiphysis, Preventative health care, and Tobacco abuse.   has a past surgical history that includes Hip fracture surgery; joint replacement; Tonsillectomy; Elbow surgery; Foot surgery; hip surgery (Left, 1974); joint replacement (Right, 2019); Elbow surgery (Right); Foot surgery (Bilateral); Dental surgery; arthrography (Right, 10/8/2020); and Revision total hip arthroplasty (Right, 1/21/2021). Restrictions  Restrictions/Precautions  Restrictions/Precautions: General Precautions, Weight Bearing, Fall Risk, ROM Restrictions  Lower Extremity Weight Bearing Restrictions  Right Lower Extremity Weight Bearing: Toe Touch Weight Bearing  Partial Weight Bearing Percentage Or Pounds: touch town weightbearing  Position Activity Restriction  Hip Precautions: No hip flexion > 90 degrees, No ADduction, No hip internal rotation  Other position/activity restrictions: Up with PT day of surgery if on floor by 2pm, Ankle pumps and Quadricep/Gluteal Isometric exercises every 2 hours while awake  Subjective   General  Chart Reviewed: Yes  Response To Previous Treatment: Patient with no complaints from previous session. Family / Caregiver Present: No  Referring Practitioner: Dr. Jimmie Briceño MD  Subjective  Subjective: Pt lying in bed upon arrival, reluctant but eventually agreeable to therapy and OOB mobility.   General Comment  Comments: Pt resting in bed on approach; RN cleared pt for therapy  Pain Screening  Patient Currently in Pain: Yes  Pain Assessment  Pain Assessment: 0-10  Pain Level: 8  Pain Type: Surgical pain  Pain Location: Hip  Pain Orientation: Right  Pain Radiating Towards: Knee  Pain Descriptors: Aching  Pain Frequency: Continuous  Pain Onset: On-going Clinical Progression: Not changed  Functional Pain Assessment: Prevents or interferes some active activities and ADLs  Non-Pharmaceutical Pain Intervention(s): Ambulation/Increased Activity; Distraction;Repositioned; Emotional support  Response to Pain Intervention: Patient Satisfied  Vital Signs  Patient Currently in Pain: Yes          Objective   Bed mobility  Supine to Sit: Minimal assistance(Min A for RLE management, HOB elevated, use of side rails)  Sit to Supine: Unable to assess(Pt seated in recliner at end of session.)  Scooting: Supervision(At EOB to achieve foot flat)  Transfers  Sit to Stand: Minimal Assistance  Stand to sit: Minimal Assistance  Bed to Chair: Minimal assistance(RW)  Comment: Pt requires cues for hand placement, VCs to maintain TTWB RLE. Pt would not allow PT to evaluate maintenance of WB through RLE, question full maintenance. Ambulation  Ambulation?: No(3ft bed>chair)  WB Status: TTWB RLE     Balance  Sitting - Static: Good  Sitting - Dynamic: Good  Standing - Static: Fair  Standing - Dynamic: Fair;-  Exercises  Quad Sets: x10 B  Heelslides: x10 B  Gluteal Sets: x10 B  Knee Long Arc Quad: x10 B  Ankle Pumps: x10 B  Comments: Pt was educated to perform ankle pumps, QS, GS every 2 hours while awake - verbalized understanding. AM-PAC Score     AM-PAC Inpatient Mobility without Stair Climbing Raw Score : 13 (01/23/21 1000)  AM-PAC Inpatient without Stair Climbing T-Scale Score : 38.96 (01/23/21 1000)  Mobility Inpatient CMS 0-100% Score: 58.44 (01/23/21 1000)  Mobility Inpatient without Stair CMS G-Code Modifier : CK (01/23/21 1000)       Goals  Short term goals  Time Frame for Short term goals: 1 week (1/28) unless otherwise specified  Short term goal 1: Pt will be SBA for bed mobility. 1/23: min A  Short term goal 2: Pt will mod A x 2 for sit<>Stand and bed<>chair transfers with LRAD. 1/22 Met. New goal: w/ min A - MET 1/23. New goal: w/ CGA. Short term goal 3: Pt will ambulate 20 ft with mod A x 2 and LRAD.   1/23: 3ft bed>chair limited by mainentance of TTWB. Short term goal 4: Pt will participate in 12-15 reps of MONICA protocol HEP by 1/25.   1/22 on-going  Patient Goals   Patient goals : \"to be able to get up to a chair without physical assist by the time I leave\"    Plan    Plan  Times per week: BID 7 days/wk  Times per day: Twice a day  Specific instructions for Next Treatment: progress mobility as tolerated  Current Treatment Recommendations: Strengthening, Neuromuscular Re-education, Home Exercise Program, ROM, Safety Education & Training, Balance Training, Endurance Training, Functional Mobility Training, Transfer Training, Gait Training, Equipment Evaluation, Education, & procurement, Patient/Caregiver Education & Training  Safety Devices  Type of devices: All fall risk precautions in place, Call light within reach, Chair alarm in place, Gait belt, Nurse notified, Left in chair, Patient at risk for falls  Restraints  Initially in place: No     Therapy Time   Individual Concurrent Group Co-treatment   Time In 0859         Time Out 0922         Minutes 23         Timed Code Treatment Minutes: [address] Smart PT     If pt is unable to be seen after this session, please let this note serve as discharge summary. Please see case management note for discharge disposition. Thank you.

## 2021-01-23 NOTE — DISCHARGE INSTR - COC
Patient Active Problem List   Diagnosis Code    Essential hypertension I10    Tobacco use disorder F17.200    Chronic hepatitis C virus infection ([facility] [address] B18.2    Narcotic abuse ([facility] [address] F11.10    Degeneration of lumbar or lumbosacral intervertebral disc M51.37    Hyperglycemia R73.9    S/P revision of total hip Z96.649    Anemia due to chronic renal failure treated with erythropoietin, stage 3 (moderate) N18.30, D63.1    Anesthesia complication V97.36HO    Anxiety F41.9    Backache M54.9    Cataract, nuclear sclerotic senile, bilateral H25.13    Diaphragmatic hernia K44.9    Failed orthopedic implant [facility]) T84.498A    Former smoker Z87.891    Glaucoma suspect of both eyes H40.003    Heartburn R12    Hyperlipemia E78.5    Infection of prosthetic hip joint ([facility] [address] T84.59XA, Z96.649    Keratitis sicca, bilateral ([facility] [address] M35.01    Drug-induced thrombocytopenia D69.59, T50.905A    Folate deficiency E53.8    Low testosterone R79.89    Positive colorectal cancer screening using Cologuard test R19.5    Positive MAULIK (antinuclear antibody) R76.8    Periodontitis K05.30    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    Panhypopituitarism (HCC) E23.0    GHD (growth hormone deficiency) (HCC) E23.0    Other, mixed, or unspecified nondependent drug abuse, unspecified F19.10    Other insomnia G47.09    Other cirrhosis of liver (HCC) K74.69    Obesity, Class II, BMI 35-39.9 E66.9    Nontraumatic slipped upper femoral epiphysis M93.003    Lower extremity edema R60.0    Methadone dependence (HCC) F11.20       Isolation/Infection:   Isolation            No Isolation          Patient Infection Status       Infection Onset Added Last Indicated Last Indicated By Review Planned Expiration Resolved Resolved By    None active    Resolved    COVID-19 Rule Out 01/22/21 01/22/21 01/22/21 COVID-19 (Ordered)   01/22/21 Rule-Out Test Resulted            Nurse Assessment: Last Vital Signs: BP (!) 118/56   Pulse 51   Temp 97.8 °F (36.6 °C) (Oral)   Resp 20   Ht 6' 1\" (1.854 m)   Wt 251 lb (113.9 kg)   SpO2 99%   BMI 33.12 kg/m²     Last documented pain score (0-10 scale): Pain Level: 8  Last Weight:   Wt Readings from Last 1 Encounters:   01/21/21 251 lb (113.9 kg)     Mental Status:  {IP PT MENTAL STATUS:20030:::0}    IV Access:  { APOLLO IV ACCESS:368822509:::0}    Nursing Mobility/ADLs:  Walking   {CHP DME ADLs:347504315:::0}  Transfer  {CHP DME ADLs:262546331:::0}  Bathing  {CHP DME ADLs:727886869:::0}  Dressing  {CHP DME ADLs:258057240:::0}  Toileting  {CHP DME ADLs:295538898:::0}  Feeding  {CHP DME ADLs:766960599:::0}  Med Admin  {CHP DME ADLs:102922246:::0}  Med Delivery   { APOLLO MED Delivery:901174005:::0}    Wound Care Documentation and Therapy:        Elimination:  Continence:   · Bowel: {YES / QJ:73756}  · Bladder: {YES / HT:26935}  Urinary Catheter: {Urinary Catheter:698330861:::0}   Colostomy/Ileostomy/Ileal Conduit: {YES / QV:52042}       Date of Last BM: ***    Intake/Output Summary (Last 24 hours) at 1/23/2021 0853  Last data filed at 1/23/2021 0510  Gross per 24 hour   Intake 100 ml   Output 1550 ml   Net -1450 ml     I/O last 3 completed shifts:   In: 100 [P.O.:100]  Out: 1550 [Urine:1550]    Safety Concerns:     [address] [employer] Safety Concerns:376864822:::0}    Impairments/Disabilities:      [address] [employer] Impairments/Disabilities:533900014:::0}    Nutrition Therapy:  Current Nutrition Therapy:   [address] [employer] Diet List:832422717:::0}    Routes of Feeding: {CHP DME Other Feedings:335539060:::0}  Liquids: {Slp liquid thickness:48177}  Daily Fluid Restriction: {CHP DME Yes amt example:836262619:::0}  Last Modified Barium Swallow with Video (Video Swallowing Test): {Done Not Done VNIN:241037782:::9}    Treatments at the Time of Hospital Discharge:   Respiratory Treatments: ***  Oxygen Therapy:  {Therapy; copd oxygen:89356:::0}  Ventilator:    { CC Vent SICL:894027025:::4} Rehab Therapies: {THERAPEUTIC INTERVENTION:1362710246}  Weight Bearing Status/Restrictions: [address] FAR[address] Weight Bearin:::0}  Other Medical Equipment (for information only, NOT a DME order):  {EQUIPMENT:201958149}  Other Treatments: ***    Patient's personal belongings (please select all that are sent with patient):  {CHP DME Belongings:379544823:::0}    RN SIGNATURE:  {Esignature:028944450:::0}    CASE MANAGEMENT/SOCIAL WORK SECTION    Inpatient Status Date:     Readmission Risk Assessment Score:  Readmission Risk              Risk of Unplanned Readmission:        12           Discharging to Facility/ Agency   · Name: [facility]  · Address:  · Phone: [phone]  · Fax:    Dialysis Facility (if applicable)   · Name:  · Address:  · Dialysis Schedule:  · Phone:  · Fax:    / signature: Electronically signed by Robert Dickinson RN on 21 at 1:02 PM EST    PHYSICIAN SECTION    Prognosis: Good    Condition at Discharge: Stable    Rehab Potential (if transferring to Rehab): Good    Recommended Labs or Other Treatments After Discharge:     Physician Certification: I certify the above information and transfer of Susan Modi  is necessary for the continuing treatment of the diagnosis listed and that he requires Jef Brandtuel for less 30 days. Update Admission H&P: No change in H&P    PHYSICIAN SIGNATURE:  Electronically signed by JAXSON Isbell on 21 at 8:53 AM EST      Total Hip &Bipolar Replacement  Discharge Instructions    ? To prevent Clot formation, you have been placed on the following medication:  o Eliquis  ? Surgical Site Care:  o Dressing change every 5-7 days with mepilex dressing.   Can be changed at home until incision healed  o If you have staples or sutures, these will be removed on post-operative day 10-12 and steri-strips applied  o If you have glue, this will be removed in the office or gradually wear off as your incision heals o [place] is permitted if waterproof mepilex dressing is applied or when staples are removed and all areas of incision are healed. ? Physical Therapy:  o Weight Bearing Status:     Touchdown weight bearing (10-25 lbs)  o Precautions  ? Per Physical Therapy handout  ? Pain Medications  o You were given oxycodone and oxycontin  o Wean off pain medications as you deem appropriate as long as pain is under control  o Be sure to drink plenty of fluids (recommend water) while taking narcotic pain medications to prevent constipation  o You may take an over the counter laxative or stool softener as needed to prevent/treat constipation as well, we recommend Senokot S OTC. We recommend that you consider taking these medications the entire time you are taking pain medication. ? Cold packs/Ice packs/Machine  o May be used as much as necessary to reduce swelling/inflammation/soreness  o Be sure to have a barrier (cloth, clothing, towel) between your skin/incision and the ice pack to prevent frostbite  ? Contact [place]'s office if  o Increased redness, swelling, drainage of any kind, and/or pain to surgery site. As well as new onset fevers and or chills. These could signify an infection. o Calf or thigh tenderness to touch as well as increased swelling or redness. This could signify a clot formation. o Numbness or tingling to an area around the incision site or below the incision site (toes). o Any rash appears, increased  or new onset nausea/vomiting occur. This may indicate a reaction to a medication. ? Phone # [phone]  ? Follow up with Dr. Barbie Delaney at scheduled appointment time. ? I acknowledge that I have received jeyson hose and understand the instructions on how and when to wear them   __________________________________  ?  Discharging RN who has gone over instructions and acknowledges jeyson hose have been received   ____________________________________________ ? Please continue to use your Incentive Spirometer at home every hour while awake. ? Take doxycycline, antibiotic, until completed.   Follow up with Dr. Mainor Tapia.

## 2021-01-23 NOTE — PROGRESS NOTES
[facility]    1/23/2021 9:12 AM    Patient Information: Funmi Balderas   Date of Admit:  1/21/2021  Primary Care Physician:  Cortney Powell  Requesting Physician:  Lasha Conte MD    Reason for consult:   Medical evaluation and recommendations for CAD, carotid stenosis     Chief complaint:  R hip pain     History of Present Illness:  Funmi Balderas is a 56 y.o. male on Lasha Conte MD service who was admitted on 1/21/2021 for R hip revision total hip arthroplasty with removal of cement spacer. He reports this is 4th surgery. He has been previously on long term antibiotics and follows with ID outpatient. He has been at rehab past few months discharged yesterday and came for surgery yesterday. He reports no fevers, chills, sweats. He does endorse R hip pain. No other pertinent ROS. He admits to history of chronic pain syndrome, was previously on methadone prior to rehab. Now on oxycontin 20mg BID with oxycodone 15mg IR PRN. Subjective:  Pending dc    REVIEW OF SYSTEMS:   Constitutional:  Negative for fever, chills or night sweats  Eyes:  Negative for exudate, itching  Ears:  Negative for tinnitus   Nose:  Negative for rhinorrhea, epistaxis  Mouth/Throat:  Negative for hoarseness, sore throat. Respiratory:   Negative for shortness of breath, wheezing  Cardiovascular: Negative for chest pain, palpitations   Gastrointestinal:  Negative for nausea, vomiting, diarrhea  Genitourinary:  Negative for polyuria, dysuria   Hematologic/Lymphatic:  Negative for  bleeding tendency, easy bruising  Musculoskeletal:  Negative for myalgias, arthralgias  Neurologic:  Negative for  confusion,dysarthria. Skin :  Negative for itching, rash  Psychiatric:  Negative for depression, anxiety. Endocrine:  Negative for polydipsia, polyuria, heat /cold intolerance.     Past Medical History: has a past medical history of Degeneration of lumbar or lumbosacral intervertebral disc, Hepatitis C, chronic (HCC), Hiatal hernia, Hip pain, bilateral, Hyperglycemia, Hyperlipemia, Hypertension, Narcotic abuse ([address] Nontraumatic slipped upper femoral epiphysis, Preventative health care, and Tobacco abuse. Past Surgical History:   has a past surgical history that includes Hip fracture surgery; joint replacement; Tonsillectomy; Elbow surgery; Foot surgery; hip surgery (Left, 1974); joint replacement (Right, 2019); Elbow surgery (Right); Foot surgery (Bilateral); Dental surgery; arthrography (Right, 10/8/2020); and Revision total hip arthroplasty (Right, 1/21/2021). Medications:   doxycycline hyclate  100 mg Oral 2 times per day    metoprolol tartrate  50 mg Oral BID    atorvastatin  20 mg Oral Nightly    tiZANidine  4 mg Oral BID    therapeutic multivitamin-minerals  1 tablet Oral Daily    oxyCODONE  20 mg Oral Q12H    pantoprazole  40 mg Oral Daily    senna  1 tablet Oral Nightly    traZODone  50 mg Oral Nightly    sodium chloride flush  10 mL Intravenous 2 times per day    acetaminophen  650 mg Oral Q6H    bisacodyl  5 mg Oral Daily    apixaban  2.5 mg Oral BID       Allergies:  No Known Allergies     Social History:   reports that he has quit smoking. He has never used smokeless tobacco. He reports previous alcohol use. He reports that he does not use drugs. Family History:  family history includes Diabetes in an other family member; Heart Disease in an other family member; No Known Problems in his father and mother. Physical Exam:  BP (!) 118/56   Pulse 51   Temp 97.8 °F (36.6 °C) (Oral)   Resp 20   Ht 6' 1\" (1.854 m)   Wt 251 lb (113.9 kg)   SpO2 99%   BMI 33.12 kg/m²     General appearance: Appears comfortable.  Well nourished  Eyes: Sclera clear, pupils equal  ENT: Moist mucus membranes, no thrush  Neck: Trachea midline, symmetrical Cardiovascular: Regular rhythm, normal S1, S2. No murmur, gallop, rub. No edema in  lower extremities  Respiratory: Clear to auscultation bilaterally. No wheeze. Good inspiratory effort  Gastrointestinal: Abdomen soft, not tender, not distended, normal bowel sounds  Musculoskeletal: No cyanosis in digits, warm extremities  Neurologic: Cranial nerves grossly intact, no motor or speech deficits. Psychiatric: Normal affect. Alert and oriented to time, place and person. Skin: Warm, dry, normal turgor, no rash. R HIP with bruising around dressing but is C/D/I. Labs:  CBC:   Lab Results   Component Value Date    WBC 6.9 01/22/2021    RBC 3.29 01/22/2021    HGB 9.3 01/22/2021    HCT 28.1 01/22/2021    MCV 85.3 01/22/2021    MCH 28.2 01/22/2021    MCHC 33.1 01/22/2021    RDW 14.4 01/22/2021     01/22/2021    MPV 9.0 01/22/2021     BMP:    Lab Results   Component Value Date     01/22/2021    K 4.2 01/22/2021     01/22/2021    CO2 27 01/22/2021    BUN 15 01/22/2021    CREATININE 0.7 01/22/2021    CALCIUM 9.5 01/22/2021    GFRAA >60 01/22/2021    LABGLOM >60 01/22/2021    GLUCOSE 146 01/22/2021           Problem List:    Principal Problem:    S/P revision of total hip  Active Problems:    Essential hypertension    Anemia due to chronic renal failure treated with erythropoietin, stage 3 (moderate)  Resolved Problems:    * No resolved hospital problems. *     Assessment & Plan:     Complex R hip history of recurrent infections s/p multiple surgeries   Most recently removal of spacer and revision of total hip arthoplasty 01/21/1  Ortho following  ID following, recommending doxy x 1 month with follow up with primary ID outpatient  PT   Pain management. Wean off dilaudid. Tolerating diet. Discussed plan with patient.    Bowel regimen    Hypogonadism  Hold testosterone     HTN  Resume lisinopril and hctz  Continue metoprolol    HLD  Resume home statin       Hx of hepatitis C infection s/p treatment Dispo: patient planning to go to [facility] [place]  Thank you for the opportunity to participate in the care of your patient.     JEANIE Carlson - CNP   1/23/2021 9:12 AM

## 2021-01-23 NOTE — PROGRESS NOTES
Patient discharged to [facility], report given to nurse assuming care (Kimber), instructions reviewed, patient verbalizes understanding of discharge instructions. Olga Gomez

## 2021-01-23 NOTE — CARE COORDINATION
CM placed call to [facility] admissions to check on status of accepting patient. Awaiting return call. ADDENDUM 1220: received return call from [facility] who believes they can take patient back. She will review chart in epic and call CM back to confirm if they are able to take today. ADDENDUM 1225: Tennille Brothersayalajoe can accept patient today. Report number [phone] fax [phone]. [facility] liaison [phone].

## 2021-01-24 NOTE — DISCHARGE SUMMARY
Department of Orthopedic Surgery  Physician Assistant   Discharge Summary    The Adrian Conway is a 56 y.o. male admitted for right hip infection. Adrian Conway was admitted to the floor following His recovery in the PACU. Discharge Diagnosis  right hip removal of spacer and revision total hip    Current Inpatient Medications    No current facility-administered medications for this encounter. Post-operatively the patients diet was advanced as tolerated and their dressing was changed on POD #1. The incision is dressing in place, clean, dry and intact with no signs of infection. The patient remained neurovascularly intact in the right lower and had intact pulses distally. Patients calf remained soft and showed no evidence of DVT. The patient was able to move their right lower extremity without any problems post-operatively. Physical therapy and occupational therapy were consulted and began working with the patient post-operatively. The patient progressed with PT/OT as would be expected and continued to improve through their stay. The patients pain was initially controlled with IV medications but we were able to transition to oral pain medications soon after arrival to the floor and their pain remained under good control through their hospital stay. From a medical standpoint the patient remained stable and continued to have the medicine team follow throughout their stay. The patient will be discharged at this time to [address] [place] [address]  with their current diet restrictions and will continue to follow the precautions outlined to them by us and PT/OT. Cultures negative from OR.   ID consulted for recs, plan for PO doxy for 1 month and follow up with Dr. Tesfaye Mack.        Condition on Discharge: Stable    Plan Return visit in 2 weeks. .  Patient was instructed on the use of pain medications, the signs and symptoms of infection, and was given our number to call should they have any questions or concerns following discharge. For opioid prescriptions given at discharge the following statement is provided for compliance with [employer] rules. Patient being given increased dosage/quantity of opoid pain medication in excess of [employer] guidelines which noted a 30 MED daily of opioids due to the fact that he/she has undergone major orthopaedic surgery as outlined in rule 4731-11-13. Dosages and further duration of the pain medication will be re-evaluated at her post op visit in 2 weeks. Patient was educated on dosing expectations and limits of prescribing as a result of the new [facility] rules enacted August 31, 2017. Please also note that this is not the initial opoid prescription issued to this patient but a continuation of medication utilized during the hospital admission as noted in the medical record. [place]RS report has also been utilized to screen for any abuse history or suspicious activity as outlined in [place]. All efforts have been taken to prevent abuse potential and misuse of opioid medications including education, screening, and close clinical follow up.

## 2021-02-04 NOTE — H&P
History and Physical        CHIEF COMPLAINT:  Right hip infection    Reason for Admission: As Above    History Obtained From:  patient    HISTORY OF PRESENT ILLNESS:      The patient is a 56 y.o. male  who presents with right hip infection       Past Medical History:        Diagnosis Date    Degeneration of lumbar or lumbosacral intervertebral disc     Hepatitis C, chronic (HCC)     Hiatal hernia     Hip pain, bilateral     Hyperglycemia     Hyperlipemia 4/29/2013    Hypertension     Narcotic abuse ([address]     Oxycontin    Nontraumatic slipped upper femoral epiphysis     Preventative health care     Tobacco abuse      Past Surgical History:        Procedure Laterality Date    ARTHROGRAPHY Right 10/8/2020    RIGHT HIP ASPIRATION performed by Rodriguez Harrison MD at [facility]      teeth extracted    ELBOW SURGERY      Tendon    ELBOW SURGERY Right     for tendonitis    FOOT SURGERY      Left foot pinning    FOOT SURGERY Bilateral     bones reset; pins and screws placed right foot    HIP FRACTURE SURGERY      Pinning    HIP SURGERY Left 1974    left hip pinning/screws    JOINT REPLACEMENT      Right hip    JOINT REPLACEMENT Right 2019    hip replacement x2     REVISION TOTAL HIP ARTHROPLASTY Right 1/21/2021    REMOVAL OF CEMENT SPACER RIGHT HIP WITH FROZEN SECTION, REVISION TOTAL HIP ARTHROPLASTY                 Yogi Phillips / Jose Juan Case performed by Rodriguez Harrison MD at [place] [address].           Medications Prior to Admission:   Prior to Admission medications    Medication Sig Start Date End Date Taking?  Authorizing Provider   doxycycline hyclate (VIBRA-TABS) 100 MG tablet Take 1 tablet by mouth every 12 hours 1/23/21 2/22/21 Yes JAXSON Clark   acetaminophen (TYLENOL) 325 MG tablet Take 650 mg by mouth every 6 hours as needed for Pain   Yes Historical Provider, MD aspirin 81 MG EC tablet Take 81 mg by mouth daily. Historical Provider, MD       Allergies:  Patient has no known allergies. Social History:    Tobacco:  reports that he has quit smoking. He has never used smokeless tobacco.   Alcohol:  reports previous alcohol use.    Illicit Drug: No  Family History:       Problem Relation Age of Onset    No Known Problems Mother     No Known Problems Father     Diabetes Other     Heart Disease Other         MI     REVIEW OF SYSTEMS:  CONSTITUTIONAL:  negative  MUSCULOSKELETAL:  positive for  pain    PHYSICAL EXAM:  Admission weight: 260 lb (117.9 kg)  6' 1\" (185.4 cm)  VITALS:  BP (!) 150/60   Pulse 60   Temp 98.2 °F (36.8 °C) (Oral)   Resp 20   Ht 6' 1\" (1.854 m)   Wt 251 lb (113.9 kg)   SpO2 100%   BMI 33.12 kg/m²     CONSTITUTIONAL:  awake, alert, cooperative, no apparent distress, and appears stated age    MUSCULOSKELETAL:  there is no redness, warmth, or swelling of the joints  full range of motion noted  motor strength is 5 out of 5 all extremities bilaterally  tone is normal  with exception of  Right hip with limited ROM and strength has pain with ROM incision ok  Cor rrr  Lungs clear     DATA:  CBC:   Lab Results   Component Value Date    WBC 5.8 01/23/2021    RBC 3.15 01/23/2021    HGB 9.0 01/23/2021    HCT 27.1 01/23/2021    MCV 86.2 01/23/2021    MCH 28.4 01/23/2021    MCHC 33.0 01/23/2021    RDW 15.0 01/23/2021     01/23/2021    MPV 8.7 01/23/2021     BMP:    Lab Results   Component Value Date     01/22/2021    K 4.2 01/22/2021     01/22/2021    CO2 27 01/22/2021    BUN 15 01/22/2021    CREATININE 0.7 01/22/2021    CALCIUM 9.5 01/22/2021    GFRAA >60 01/22/2021    LABGLOM >60 01/22/2021    GLUCOSE 146 01/22/2021     PT/INR:  No results found for: PROTIME, INR    Radiology:  XR HIP RIGHT (1 VIEW)   Final Result   Intraoperative changes of right total hip arthroplasty revision with

## 2021-02-08 LAB
ANAEROBIC CULTURE: NORMAL
CULTURE SURGICAL: NORMAL
GRAM STAIN RESULT: NORMAL

## 2021-02-22 LAB
FUNGUS (MYCOLOGY) CULTURE: NORMAL
FUNGUS STAIN: NORMAL

## 2021-03-09 LAB
AFB CULTURE (MYCOBACTERIA): NORMAL
AFB SMEAR: NORMAL

## (undated) DEVICE — GOWN,REINF,POLY,AURORA,XLNG/XXL,STRL: Brand: MEDLINE

## (undated) DEVICE — COVER XR CASS W20XL41IN UNIV ADH STRP

## (undated) DEVICE — NEEDLE SPNL 20GA LNG YEL HUB DISP

## (undated) DEVICE — BANDAGE,ADHESIVE,PLASTIC,1"X3",ST,LF: Brand: MEDLINE

## (undated) DEVICE — SUTURE PDS II SZ 1 L96IN ABSRB VLT TP-1 L65MM 1/2 CIR Z880G

## (undated) DEVICE — STAPLER EXT SKIN 35 WIDE S STL STPL SQUEEZE HNDL VISISTAT

## (undated) DEVICE — SUTURE MCRYL SZ 2-0 L18IN ABSRB VLT L36MM CT-1 1/2 CIR Y739D

## (undated) DEVICE — PERFUSION SET 120 MM

## (undated) DEVICE — Device

## (undated) DEVICE — SOLUTION IV IRRIG 500ML 0.9% SODIUM CHL 2F7123

## (undated) DEVICE — BLOOD TRANSFUSION FILTER: Brand: HAEMONETICS

## (undated) DEVICE — GLOVE ORANGE PI 7 1/2   MSG9075

## (undated) DEVICE — DRESSING FOAM W4XL4IN SIL FACE BORD ADH PD SUP ABSRB COR

## (undated) DEVICE — SYRINGE MED 10ML LUERLOCK TIP W/O SFTY DISP

## (undated) DEVICE — CHLORAPREP 26ML ORANGE

## (undated) DEVICE — HOOD, PEEL-AWAY: Brand: FLYTE

## (undated) DEVICE — HANDPIECE SET WITH HIGH FLOW TIP AND SUCTION TUBE: Brand: INTERPULSE

## (undated) DEVICE — TOWEL,OR,DSP,ST,BLUE,STD,6/PK,12PK/CS: Brand: MEDLINE

## (undated) DEVICE — Z INACTIVE NO SUPPLIER SOLUTIONIRRIG 3000ML 0.9% SOD CHL FLX CONT [79720808] [HOSPIRA WORLDWIDE INC]

## (undated) DEVICE — GOWN,REINFORCED,POLY,AURORA,XLARGE,STRL: Brand: MEDLINE

## (undated) DEVICE — BOWL AND CEMENT CARTRIDGE WITH BREAKAWAY FEMORAL NOZZLE AND MEDIUM PRESSURIZER: Brand: ACM

## (undated) DEVICE — BIT DRILL SINGLE USE

## (undated) DEVICE — LIPIGUARD SB REINFUSION FILTER FOR SALVAGED BLOOD: Brand: LIPIGUARD SB

## (undated) DEVICE — HIP PILLOW, ABDUCTION: Brand: DEROYAL

## (undated) DEVICE — STERILE POLYISOPRENE POWDER-FREE SURGICAL GLOVES: Brand: PROTEXIS

## (undated) DEVICE — STERILE PVP: Brand: MEDLINE INDUSTRIES, INC.

## (undated) DEVICE — COVER,TABLE,HEAVY DUTY,50"X90",STRL: Brand: MEDLINE

## (undated) DEVICE — SOLUTION IV IRRIG WATER 1000ML POUR BRL 2F7114

## (undated) DEVICE — SKIN MARKER,REGULAR TIP WITH RULER AND LABELS: Brand: DEVON

## (undated) DEVICE — DRESSING ALG W4XL8IN AG FOAM SUPERABSORBENT SIL ANTIMIC

## (undated) DEVICE — AUTOTRANSFUSION BOWL SET 125 CC XTRA

## (undated) DEVICE — SOLUTION IRRIG 2000ML 0.9% SOD CHL USP UROMATIC PLAS CONT

## (undated) DEVICE — DRESSING FOAM W4XL12IN AG SIL ADH ANTIMIC POSTOP OPTIFOAM

## (undated) DEVICE — SUTURE MCRYL SZ 0 L18IN ABSRB VLT L36MM CT-1 1/2 CIR Y740D

## (undated) DEVICE — COVER,MAYO STAND,STERILE: Brand: MEDLINE

## (undated) DEVICE — SPONGE LAP W18XL18IN WHT COT 4 PLY FLD STRUNG RADPQ DISP ST

## (undated) DEVICE — Z CONVERTED USE 2271043 CONTAINER SPEC COLL 4OZ SCR ON LID PEEL PCH

## (undated) DEVICE — TUBE SUCT STD FLTR KAMVAC

## (undated) DEVICE — SHEET,DRAPE,53X77,STERILE: Brand: MEDLINE

## (undated) DEVICE — Z INACTIVE USE 2582933 BAG BLD TRNSF 1 CPLR IV ST 600ML TERUFLEX

## (undated) DEVICE — PENCIL SMK EVAC ALL IN 1 DSGN ENH VISIBILITY IMPROVED AIR